# Patient Record
Sex: FEMALE | Race: WHITE | NOT HISPANIC OR LATINO | Employment: UNEMPLOYED | URBAN - METROPOLITAN AREA
[De-identification: names, ages, dates, MRNs, and addresses within clinical notes are randomized per-mention and may not be internally consistent; named-entity substitution may affect disease eponyms.]

---

## 2022-06-09 ENCOUNTER — HOSPITAL ENCOUNTER (INPATIENT)
Facility: HOSPITAL | Age: 37
LOS: 2 days | Discharge: HOME | End: 2022-06-11
Attending: SPECIALIST | Admitting: SPECIALIST
Payer: MEDICAID

## 2022-06-09 DIAGNOSIS — K85.90 ACUTE PANCREATITIS, UNSPECIFIED COMPLICATION STATUS, UNSPECIFIED PANCREATITIS TYPE: Primary | ICD-10-CM

## 2022-06-09 DIAGNOSIS — R10.9 ABDOMINAL PAIN: ICD-10-CM

## 2022-06-09 LAB
ABO + RH BLD: NORMAL
ALBUMIN SERPL BCP-MCNC: 2.8 G/DL (ref 3.5–5.2)
ALP SERPL-CCNC: 86 U/L (ref 55–135)
ALT SERPL W/O P-5'-P-CCNC: 9 U/L (ref 10–44)
ANION GAP SERPL CALC-SCNC: 9 MMOL/L (ref 8–16)
AST SERPL-CCNC: 15 U/L (ref 10–40)
BACTERIA #/AREA URNS HPF: ABNORMAL /HPF
BASOPHILS # BLD AUTO: 0.02 K/UL (ref 0–0.2)
BASOPHILS NFR BLD: 0.1 % (ref 0–1.9)
BILIRUB SERPL-MCNC: 1.3 MG/DL (ref 0.1–1)
BILIRUB UR QL STRIP: ABNORMAL
BLD GP AB SCN CELLS X3 SERPL QL: NORMAL
BUN SERPL-MCNC: 12 MG/DL (ref 6–20)
CALCIUM SERPL-MCNC: 8.4 MG/DL (ref 8.7–10.5)
CHLORIDE SERPL-SCNC: 102 MMOL/L (ref 95–110)
CLARITY UR: CLEAR
CO2 SERPL-SCNC: 20 MMOL/L (ref 23–29)
COLOR UR: YELLOW
CREAT SERPL-MCNC: 0.7 MG/DL (ref 0.5–1.4)
DIFFERENTIAL METHOD: ABNORMAL
EOSINOPHIL # BLD AUTO: 0.1 K/UL (ref 0–0.5)
EOSINOPHIL NFR BLD: 0.6 % (ref 0–8)
ERYTHROCYTE [DISTWIDTH] IN BLOOD BY AUTOMATED COUNT: 17.4 % (ref 11.5–14.5)
EST. GFR  (AFRICAN AMERICAN): >60 ML/MIN/1.73 M^2
EST. GFR  (NON AFRICAN AMERICAN): >60 ML/MIN/1.73 M^2
GLUCOSE SERPL-MCNC: 106 MG/DL (ref 70–110)
GLUCOSE UR QL STRIP: NEGATIVE
HCT VFR BLD AUTO: 39 % (ref 37–48.5)
HGB BLD-MCNC: 12.5 G/DL (ref 12–16)
HGB UR QL STRIP: NEGATIVE
HYALINE CASTS #/AREA URNS LPF: 40 /LPF
IMM GRANULOCYTES # BLD AUTO: 0.07 K/UL (ref 0–0.04)
IMM GRANULOCYTES NFR BLD AUTO: 0.5 % (ref 0–0.5)
KETONES UR QL STRIP: 100
LACTATE SERPL-SCNC: 1.5 MMOL/L (ref 0.5–1.9)
LEUKOCYTE ESTERASE UR QL STRIP: NEGATIVE
LIPASE SERPL-CCNC: 551 U/L (ref 4–60)
LYMPHOCYTES # BLD AUTO: 1.3 K/UL (ref 1–4.8)
LYMPHOCYTES NFR BLD: 8.6 % (ref 18–48)
MCH RBC QN AUTO: 26.4 PG (ref 27–31)
MCHC RBC AUTO-ENTMCNC: 32.1 G/DL (ref 32–36)
MCV RBC AUTO: 82 FL (ref 82–98)
MICROSCOPIC COMMENT: ABNORMAL
MONOCYTES # BLD AUTO: 0.8 K/UL (ref 0.3–1)
MONOCYTES NFR BLD: 5.2 % (ref 4–15)
NEUTROPHILS # BLD AUTO: 13.2 K/UL (ref 1.8–7.7)
NEUTROPHILS NFR BLD: 85 % (ref 38–73)
NITRITE UR QL STRIP: NEGATIVE
NRBC BLD-RTO: 0 /100 WBC
PH UR STRIP: 6 [PH] (ref 5–8)
PLATELET # BLD AUTO: 221 K/UL (ref 150–450)
PMV BLD AUTO: 11.1 FL (ref 9.2–12.9)
POTASSIUM SERPL-SCNC: 4.4 MMOL/L (ref 3.5–5.1)
PROT SERPL-MCNC: 6.4 G/DL (ref 6–8.4)
PROT UR QL STRIP: ABNORMAL
RBC # BLD AUTO: 4.74 M/UL (ref 4–5.4)
RBC #/AREA URNS HPF: 3 /HPF (ref 0–4)
SODIUM SERPL-SCNC: 131 MMOL/L (ref 136–145)
SP GR UR STRIP: >1.03 (ref 1–1.03)
SQUAMOUS #/AREA URNS HPF: 10 /HPF
URN SPEC COLLECT METH UR: ABNORMAL
UROBILINOGEN UR STRIP-ACNC: NEGATIVE EU/DL
WBC # BLD AUTO: 15.49 K/UL (ref 3.9–12.7)
WBC #/AREA URNS HPF: 9 /HPF (ref 0–5)

## 2022-06-09 PROCEDURE — 86850 RBC ANTIBODY SCREEN: CPT | Performed by: SPECIALIST

## 2022-06-09 PROCEDURE — 93010 ELECTROCARDIOGRAM REPORT: CPT | Mod: ,,, | Performed by: INTERNAL MEDICINE

## 2022-06-09 PROCEDURE — 85025 COMPLETE CBC W/AUTO DIFF WBC: CPT | Performed by: NURSE PRACTITIONER

## 2022-06-09 PROCEDURE — 87040 BLOOD CULTURE FOR BACTERIA: CPT | Mod: 59 | Performed by: PHYSICIAN ASSISTANT

## 2022-06-09 PROCEDURE — 63600175 PHARM REV CODE 636 W HCPCS: Performed by: SPECIALIST

## 2022-06-09 PROCEDURE — 25000003 PHARM REV CODE 250: Performed by: SPECIALIST

## 2022-06-09 PROCEDURE — 83605 ASSAY OF LACTIC ACID: CPT | Performed by: PHYSICIAN ASSISTANT

## 2022-06-09 PROCEDURE — S5010 5% DEXTROSE AND 0.45% SALINE: HCPCS | Performed by: SPECIALIST

## 2022-06-09 PROCEDURE — 36415 COLL VENOUS BLD VENIPUNCTURE: CPT | Performed by: PHYSICIAN ASSISTANT

## 2022-06-09 PROCEDURE — 93005 ELECTROCARDIOGRAM TRACING: CPT | Performed by: INTERNAL MEDICINE

## 2022-06-09 PROCEDURE — 83690 ASSAY OF LIPASE: CPT | Performed by: NURSE PRACTITIONER

## 2022-06-09 PROCEDURE — 12000002 HC ACUTE/MED SURGE SEMI-PRIVATE ROOM

## 2022-06-09 PROCEDURE — 93010 EKG 12-LEAD: ICD-10-PCS | Mod: ,,, | Performed by: INTERNAL MEDICINE

## 2022-06-09 PROCEDURE — 80053 COMPREHEN METABOLIC PANEL: CPT | Performed by: NURSE PRACTITIONER

## 2022-06-09 PROCEDURE — 81001 URINALYSIS AUTO W/SCOPE: CPT | Performed by: NURSE PRACTITIONER

## 2022-06-09 PROCEDURE — 99999 HC NO LEVEL OF SERVICE - ED ONLY: CPT

## 2022-06-09 RX ORDER — BETAMETHASONE SODIUM PHOSPHATE AND BETAMETHASONE ACETATE 3; 3 MG/ML; MG/ML
12 INJECTION, SUSPENSION INTRA-ARTICULAR; INTRALESIONAL; INTRAMUSCULAR; SOFT TISSUE
Status: COMPLETED | OUTPATIENT
Start: 2022-06-09 | End: 2022-06-10

## 2022-06-09 RX ORDER — BETAMETHASONE SODIUM PHOSPHATE AND BETAMETHASONE ACETATE 3; 3 MG/ML; MG/ML
6 INJECTION, SUSPENSION INTRA-ARTICULAR; INTRALESIONAL; INTRAMUSCULAR; SOFT TISSUE
Status: DISCONTINUED | OUTPATIENT
Start: 2022-06-09 | End: 2022-06-09

## 2022-06-09 RX ORDER — ZOLPIDEM TARTRATE 5 MG/1
5 TABLET ORAL NIGHTLY PRN
Status: DISCONTINUED | OUTPATIENT
Start: 2022-06-09 | End: 2022-06-11 | Stop reason: HOSPADM

## 2022-06-09 RX ORDER — ONDANSETRON 4 MG/1
TABLET, FILM COATED ORAL 2 TIMES DAILY
Status: ON HOLD | COMMUNITY
End: 2022-06-11

## 2022-06-09 RX ORDER — MORPHINE SULFATE 4 MG/ML
2 INJECTION, SOLUTION INTRAMUSCULAR; INTRAVENOUS EVERY 4 HOURS PRN
Status: DISCONTINUED | OUTPATIENT
Start: 2022-06-09 | End: 2022-06-11 | Stop reason: HOSPADM

## 2022-06-09 RX ORDER — DEXTROSE MONOHYDRATE AND SODIUM CHLORIDE 5; .45 G/100ML; G/100ML
INJECTION, SOLUTION INTRAVENOUS CONTINUOUS
Status: DISCONTINUED | OUTPATIENT
Start: 2022-06-09 | End: 2022-06-11 | Stop reason: HOSPADM

## 2022-06-09 RX ORDER — ONDANSETRON 2 MG/ML
8 INJECTION INTRAMUSCULAR; INTRAVENOUS EVERY 12 HOURS PRN
Status: DISCONTINUED | OUTPATIENT
Start: 2022-06-09 | End: 2022-06-10

## 2022-06-09 RX ORDER — SODIUM CHLORIDE, SODIUM LACTATE, POTASSIUM CHLORIDE, CALCIUM CHLORIDE 600; 310; 30; 20 MG/100ML; MG/100ML; MG/100ML; MG/100ML
INJECTION, SOLUTION INTRAVENOUS CONTINUOUS
Status: DISCONTINUED | OUTPATIENT
Start: 2022-06-09 | End: 2022-06-11 | Stop reason: HOSPADM

## 2022-06-09 RX ADMIN — DEXTROSE AND SODIUM CHLORIDE: 5; .45 INJECTION, SOLUTION INTRAVENOUS at 07:06

## 2022-06-09 RX ADMIN — BETAMETHASONE ACETATE AND BETAMETHASONE SODIUM PHOSPHATE 12 MG: 3; 3 INJECTION, SUSPENSION INTRA-ARTICULAR; INTRALESIONAL; INTRAMUSCULAR; SOFT TISSUE at 08:06

## 2022-06-09 RX ADMIN — SODIUM CHLORIDE, SODIUM LACTATE, POTASSIUM CHLORIDE, AND CALCIUM CHLORIDE 1000 ML: .6; .31; .03; .02 INJECTION, SOLUTION INTRAVENOUS at 08:06

## 2022-06-09 RX ADMIN — SODIUM CHLORIDE, SODIUM LACTATE, POTASSIUM CHLORIDE, AND CALCIUM CHLORIDE: .6; .31; .03; .02 INJECTION, SOLUTION INTRAVENOUS at 09:06

## 2022-06-09 RX ADMIN — MORPHINE SULFATE 2 MG: 4 INJECTION, SOLUTION INTRAMUSCULAR; INTRAVENOUS at 08:06

## 2022-06-09 NOTE — FIRST PROVIDER EVALUATION
Emergency Department TeleTriage Encounter Note      CHIEF COMPLAINT    Chief Complaint   Patient presents with    Abdominal Pain     36 WEEKS, SEEN AND OBSERVED AT [facility] X 1 DAY SENT HERE FOR EVAL OF PANCREATITIS       VITAL SIGNS   Initial Vitals [06/09/22 1608]   BP Pulse Resp Temp SpO2   (!) 140/115 (!) 136 (!) 22 98.2 °F (36.8 °C) 98 %      MAP       --            ALLERGIES    Review of patient's allergies indicates:  No Known Allergies    PROVIDER TRIAGE NOTE  Patient is a 36-year-old female who is currently 36 weeks pregnant.  She was admitted at [facility] for acute pancreatitis but her OB is going out of town and recommended she come to a higher level of care facility.  She was receiving IV fluids, nausea medicine and pain medicine as needed.    MRI abdomen 6/9 at [place]: Findings compatible with acute interstitial edematous pancreatitis.     Imaging from prior facility reviewed.  Will initiate additional lab work.  Patient is currently tachycardic.      ORDERS  Labs Reviewed   CBC W/ AUTO DIFFERENTIAL   COMPREHENSIVE METABOLIC PANEL   URINALYSIS, REFLEX TO URINE CULTURE   LIPASE   POCT URINE PREGNANCY       ED Orders (720h ago, onward)    Start Ordered     Status Ordering Provider    06/09/22 2027 06/09/22 1627  Lactic acid, plasma #2  In 4 hours         Ordered NEGROTTO SANTOSH SOSA    06/09/22 1627 06/09/22 1627  ED Preference List Used to Initiate Sepsis Orders  Until discontinued         Ordered NEGROTTO SANTOSH SOSA    06/09/22 1627 06/09/22 1627  Blood culture x two cultures. Draw prior to antibiotics.  Every 15 min      Comments: Aerobic and anaerobic     Start Status Ordering Provider   06/09/22 1627 Scheduled NEGROTTO SANTOSH SOSA   06/09/22 1642 Scheduled NEGROTTO SHEILA SANTOSH       Ordered NEGROTTO SHEILASANTOSH    06/09/22 1627 06/09/22 1627  Lactic acid, plasma #1  STAT         Ordered NEGROTTO SHEILASANTOSH LANE    06/09/22 1627 06/09/22 1627   Vital signs  Every 15 min        Comments: Every 15 minutes until SBP greater than 90 or MAP greater than 65, then every 30 minutes times one hour, then every one hour.    Ordered NEGROTTO SHEILA SANTOSH    06/09/22 1627 06/09/22 1627  Bed rest  Until discontinued         Ordered NEGROTTO SHEILA, SANTOSH    06/09/22 1627 06/09/22 1627  Cardiac Monitoring - Adult  Continuous        Comments: Notify Physician If:    Ordered NEGROTTO SHEILASANTOSH LANE    06/09/22 1627 06/09/22 1627  Pulse Oximetry Continuous  Continuous         Ordered NEGROTTO SHEILASANTOSH LANE    06/09/22 1627 06/09/22 1627  Strict intake and output  Until discontinued         Ordered NEGROTTO SHEILASANTOSH LANE    06/09/22 1611 06/09/22 1611  CBC auto differential  STAT         In process LORENA DIAZ    06/09/22 1611 06/09/22 1611  Comprehensive metabolic panel  STAT         In process LORENA DIAZ    06/09/22 1611 06/09/22 1611  Urinalysis, Reflex to Urine Culture Urine, Clean Catch  STAT        Comments: In and Out Cath as needed it patient unable to void      Acknowledged LORENA DIAZ    06/09/22 1611 06/09/22 1611  IV Saline Lock  Once         Acknowledged LORENA DIAZ    06/09/22 1611 06/09/22 1611  Lipase  STAT        Comments: For upper or mid abdominal pain.      In process LORENA DIAZ    06/09/22 1611 06/09/22 1611  POCT urine pregnancy  Once        Comments: For women of childbearing age w/o hysterectomy.    Acknowledged LORENA DIAZ    06/09/22 1611 06/09/22 1611  EKG 12-lead  Once        Comments: If patient >45 yrs.    Acknowledged LORENA DIAZ    06/09/22 1611 06/09/22 1611  Assess fetal heart tones  Until discontinued         Acknowledged LORENA DIAZ            Virtual Visit Note: The provider triage portion of this emergency department evaluation and documentation was performed via [employer], a HIPAA-compliant telemedicine application, in concert with a tele-presenter in  the room. A face to face patient evaluation with one of my colleagues will occur once the patient is placed in an emergency department room.      DISCLAIMER: This note was prepared with [employer] voice recognition transcription software. Garbled syntax, mangled pronouns, and other bizarre constructions may be attributed to that software system.

## 2022-06-10 LAB
ALBUMIN SERPL BCP-MCNC: 2.3 G/DL (ref 3.5–5.2)
ALP SERPL-CCNC: 75 U/L (ref 55–135)
ALT SERPL W/O P-5'-P-CCNC: 9 U/L (ref 10–44)
AMYLASE SERPL-CCNC: 269 U/L (ref 20–110)
ANION GAP SERPL CALC-SCNC: 10 MMOL/L (ref 8–16)
AST SERPL-CCNC: 13 U/L (ref 10–40)
BASOPHILS # BLD AUTO: 0.02 K/UL (ref 0–0.2)
BASOPHILS NFR BLD: 0.1 % (ref 0–1.9)
BILIRUB SERPL-MCNC: 1 MG/DL (ref 0.1–1)
BUN SERPL-MCNC: 10 MG/DL (ref 6–20)
CALCIUM SERPL-MCNC: 8.6 MG/DL (ref 8.7–10.5)
CHLORIDE SERPL-SCNC: 102 MMOL/L (ref 95–110)
CO2 SERPL-SCNC: 20 MMOL/L (ref 23–29)
CREAT SERPL-MCNC: 0.6 MG/DL (ref 0.5–1.4)
DIFFERENTIAL METHOD: ABNORMAL
EOSINOPHIL # BLD AUTO: 0 K/UL (ref 0–0.5)
EOSINOPHIL NFR BLD: 0 % (ref 0–8)
ERYTHROCYTE [DISTWIDTH] IN BLOOD BY AUTOMATED COUNT: 17.3 % (ref 11.5–14.5)
EST. GFR  (AFRICAN AMERICAN): >60 ML/MIN/1.73 M^2
EST. GFR  (NON AFRICAN AMERICAN): >60 ML/MIN/1.73 M^2
GLUCOSE SERPL-MCNC: 115 MG/DL (ref 70–110)
HCT VFR BLD AUTO: 31.9 % (ref 37–48.5)
HGB BLD-MCNC: 10.1 G/DL (ref 12–16)
IMM GRANULOCYTES # BLD AUTO: 0.12 K/UL (ref 0–0.04)
IMM GRANULOCYTES NFR BLD AUTO: 0.8 % (ref 0–0.5)
LIPASE SERPL-CCNC: 163 U/L (ref 4–60)
LYMPHOCYTES # BLD AUTO: 0.9 K/UL (ref 1–4.8)
LYMPHOCYTES NFR BLD: 6.2 % (ref 18–48)
MCH RBC QN AUTO: 26.6 PG (ref 27–31)
MCHC RBC AUTO-ENTMCNC: 31.7 G/DL (ref 32–36)
MCV RBC AUTO: 84 FL (ref 82–98)
MONOCYTES # BLD AUTO: 0.5 K/UL (ref 0.3–1)
MONOCYTES NFR BLD: 3.7 % (ref 4–15)
NEUTROPHILS # BLD AUTO: 13.1 K/UL (ref 1.8–7.7)
NEUTROPHILS NFR BLD: 89.2 % (ref 38–73)
NRBC BLD-RTO: 0 /100 WBC
PLATELET # BLD AUTO: 194 K/UL (ref 150–450)
PMV BLD AUTO: 11.3 FL (ref 9.2–12.9)
POTASSIUM SERPL-SCNC: 4.5 MMOL/L (ref 3.5–5.1)
PROT SERPL-MCNC: 5.5 G/DL (ref 6–8.4)
RBC # BLD AUTO: 3.8 M/UL (ref 4–5.4)
SODIUM SERPL-SCNC: 132 MMOL/L (ref 136–145)
WBC # BLD AUTO: 14.65 K/UL (ref 3.9–12.7)

## 2022-06-10 PROCEDURE — 36415 COLL VENOUS BLD VENIPUNCTURE: CPT | Performed by: SPECIALIST

## 2022-06-10 PROCEDURE — 12000002 HC ACUTE/MED SURGE SEMI-PRIVATE ROOM

## 2022-06-10 PROCEDURE — 25000003 PHARM REV CODE 250: Performed by: SPECIALIST

## 2022-06-10 PROCEDURE — 82150 ASSAY OF AMYLASE: CPT | Performed by: SPECIALIST

## 2022-06-10 PROCEDURE — 63600175 PHARM REV CODE 636 W HCPCS: Performed by: SPECIALIST

## 2022-06-10 PROCEDURE — 85025 COMPLETE CBC W/AUTO DIFF WBC: CPT | Performed by: SPECIALIST

## 2022-06-10 PROCEDURE — 83690 ASSAY OF LIPASE: CPT | Performed by: SPECIALIST

## 2022-06-10 PROCEDURE — 80053 COMPREHEN METABOLIC PANEL: CPT | Performed by: SPECIALIST

## 2022-06-10 RX ORDER — FAMOTIDINE 10 MG/ML
20 INJECTION INTRAVENOUS 2 TIMES DAILY
Status: DISCONTINUED | OUTPATIENT
Start: 2022-06-10 | End: 2022-06-11 | Stop reason: HOSPADM

## 2022-06-10 RX ORDER — ONDANSETRON 2 MG/ML
4 INJECTION INTRAMUSCULAR; INTRAVENOUS EVERY 4 HOURS PRN
Status: DISCONTINUED | OUTPATIENT
Start: 2022-06-10 | End: 2022-06-11 | Stop reason: HOSPADM

## 2022-06-10 RX ORDER — ACETAMINOPHEN 500 MG
1000 TABLET ORAL ONCE
Status: COMPLETED | OUTPATIENT
Start: 2022-06-10 | End: 2022-06-10

## 2022-06-10 RX ADMIN — BETAMETHASONE ACETATE AND BETAMETHASONE SODIUM PHOSPHATE 12 MG: 3; 3 INJECTION, SUSPENSION INTRA-ARTICULAR; INTRALESIONAL; INTRAMUSCULAR; SOFT TISSUE at 09:06

## 2022-06-10 RX ADMIN — ACETAMINOPHEN 1000 MG: 500 TABLET ORAL at 12:06

## 2022-06-10 RX ADMIN — MORPHINE SULFATE 2 MG: 4 INJECTION, SOLUTION INTRAMUSCULAR; INTRAVENOUS at 06:06

## 2022-06-10 RX ADMIN — ONDANSETRON 4 MG: 2 INJECTION INTRAMUSCULAR; INTRAVENOUS at 03:06

## 2022-06-10 RX ADMIN — SODIUM CHLORIDE, SODIUM LACTATE, POTASSIUM CHLORIDE, AND CALCIUM CHLORIDE: .6; .31; .03; .02 INJECTION, SOLUTION INTRAVENOUS at 04:06

## 2022-06-10 RX ADMIN — ONDANSETRON 8 MG: 2 INJECTION INTRAMUSCULAR; INTRAVENOUS at 06:06

## 2022-06-10 RX ADMIN — MORPHINE SULFATE 2 MG: 4 INJECTION, SOLUTION INTRAMUSCULAR; INTRAVENOUS at 07:06

## 2022-06-10 RX ADMIN — FAMOTIDINE 20 MG: 10 INJECTION INTRAVENOUS at 07:06

## 2022-06-10 RX ADMIN — FAMOTIDINE 20 MG: 10 INJECTION INTRAVENOUS at 09:06

## 2022-06-10 RX ADMIN — PROMETHAZINE HYDROCHLORIDE 12.5 MG: 25 INJECTION INTRAMUSCULAR; INTRAVENOUS at 07:06

## 2022-06-10 NOTE — CONSULTS
GASTROENTEROLOGY INPATIENT CONSULT NOTE  Patient Name: Cely Dove  Patient MRN: [id number]  Patient : 1985    Admit Date: 2022  Service date: 22    Reason for Consult:  Abdominal pain    PCP: Robina Bar MD    Chief Complaint   Patient presents with    Abdominal Pain     36 WEEKS, SEEN AND OBSERVED AT [facility] X 1 DAY SENT HERE FOR EVAL OF PANCREATITIS       HPI: Patient is a 36 y.o. female 35 weeks pregnant admitted to [facility] on Wednesday with acute severe persistent epigastric stabbing pain new onset.  Underwent ultrasound lipase an MRI testing showing elevated lipase and findings of pancreatitis without choledocholithiasis.  Patient received IV hydration was subsequently discharged due to lack of high-risk OB coverage over the weekend.  Patient presented in our ER with ongoing moderate epigastric discomfort and was subsequently admitted.  Labs today represent hemodilution after receiving an additional bolus of fluid and fluid overnight.  She reports that her pain is improving currently rated 5/10 from 10/10 on Wednesday.  Denies nausea vomiting.  Tolerating liquid diet    Past Medical History:  Past Medical History:   Diagnosis Date    Chronic neck pain     Headache, chronic migraine without aura     Nicotine dependence with current use     Pancreatitis     Simple obesity         Past Surgical History:  Past Surgical History:   Procedure Laterality Date    APPENDECTOMY       SECTION      TUBAL LIGATION      tubal ligation reversal Bilateral         Home Medications:  Medications Prior to Admission   Medication Sig Dispense Refill Last Dose    ondansetron (ZOFRAN) 4 MG tablet Take by mouth 2 (two) times daily.   2022 at Unknown time    norethindrone-e.estradiol-iron (LO LOESTRIN FE) 1 mg-10 mcg(24) /10 mcg (2) Tab Take 1 tablet by mouth once daily. 28 tablet 12     prenatal vit/iron fum/folic ac (PRENATAL 1+1 ORAL) Take by mouth.     "      Inpatient Medications:   betamethasone acetate-betamethasone sodium phosphate  12 mg Intramuscular Q24H    famotidine (PF)  20 mg Intravenous BID     morphine, ondansetron, promethazine (PHENERGAN) IVPB, zolpidem    Review of patient's allergies indicates:  No Known Allergies    Social History:   Social History     Occupational History    Not on file   Tobacco Use    Smoking status: Never Smoker    Smokeless tobacco: Not on file   Substance and Sexual Activity    Alcohol use: No    Drug use: No    Sexual activity: Yes       Family History:   Family History   Problem Relation Age of Onset    Breast cancer Neg Hx     Ovarian cancer Neg Hx        Review of Systems:  A 10 point review of systems was performed and was normal, except as mentioned in the HPI, including constitutional, HEENT, heme, lymph, cardiovascular, respiratory, gastrointestinal, genitourinary, neurologic, endocrine, psychiatric and musculoskeletal.      OBJECTIVE:    Physical Exam:  24 Hour Vital Sign Ranges: Temp:  [98.2 °F (36.8 °C)-99.1 °F (37.3 °C)] 98.7 °F (37.1 °C)  Pulse:  [] 106  Resp:  [16-22] 16  SpO2:  [98 %-100 %] 100 %  BP: (103-140)/() 107/64  Most recent vitals: /64   Pulse 106   Temp 98.7 °F (37.1 °C) (Oral)   Resp 16   Ht 5' 6" (1.676 m)   Wt 96.6 kg (213 lb)   SpO2 100%   Breastfeeding No   BMI 34.38 kg/m²    GEN: well-developed, well-nourished, awake and alert, non-toxic appearing adult  HEENT: PERRL, sclera anicteric, oral mucosa pink and moist without lesion  NECK: trachea midline; Good ROM  CV: regular rate and rhythm, no murmurs or gallops  RESP: clear to auscultation bilaterally, no wheezes, rhonci or rales  ABD: soft, non-tender, non-distended, normal bowel sounds  EXT: no swelling or edema, 2+ pulses distally  SKIN: no rashes or jaundice  PSYCH: normal affect    Labs:   Recent Labs     06/09/22  1622 06/10/22  0559   WBC 15.49* 14.65*   MCV 82 84    194     Recent Labs     " 06/09/22  1622 06/10/22  0559   * 132*   K 4.4 4.5    102   CO2 20* 20*   BUN 12 10    115*     No results for input(s): ALB in the last 72 hours.    Invalid input(s): ALKP, SGOT, SGPT, TBIL, DBIL, TPRO  No results for input(s): PT, INR, PTT in the last 72 hours.      Radiology Review:  US OB 14+ Weeks TransAbd, w/Biophysical Profile, w/o NST, Single Gestation (xpd)    (Results Pending)         IMPRESSION / RECOMMENDATIONS:  Biliary pancreatitis  -continue with IV hydration, ambulation, SCDs or DVT prophylaxis if patient lying in bed.  Recommend cholecystectomy in near future defer to gyn and surgery.  No evidence of choledocholithiasis and LFTs normal  -will f/u with patient tomorrow and likely d/c if continues to improve    Thank you for this consult.    Adriano Tavarez  6/10/2022  2:41 PM

## 2022-06-10 NOTE — NURSING
Updated Dr. Cabrera on patients status. Dr. Tavarez has not been to unit to assess patient. Orders to contact Dr. Tavarez to get an approximate time of arrival in order to make plan of care. Also orders to discontinue EFM due to reactive fetal heart tones. Will update when speak with Dr. Tavarez.    1135 Patient sleeping in bed. NAD.    12:03 PM Patient stated she felt like her contractions were coming more frequently. TOCO placed.    1:01 PM Dr. Cabrera at bedside for assessment. Told patient Dr. Tavarez should be on unit shortly for evaluation and they will decide plan of care from there. Patient and family agreed.    2:33 PM Dr. Tavarez at bedside for GI consultation. Orders for patient to ambulate around unit, clear liquids, continue IV fluids. May discharge tomorrow.    3:38 PM Pt ambulating hallways with family.    5:05 PM Pt back from walk. Resting quietly in bed.

## 2022-06-10 NOTE — H&P
[facility]  Obstetrics  History & Physical    Patient Name: Cely Dove  MRN: [id number]  Admission Date: 2022  Primary Care Provider: Robina Bar MD    Subjective:     Principal Problem:Pancreatitis    History of Present Illness:  36-year-old white female  4 para 2 at 35 weeks and 1 day presents to [facility] Emergency Room after having left [facility] against medical advice for a diagnosis of acute pancreatitis.  Patient was admitted yesterday .  Patient apparently was encouraged to leave to go to a higher level of care as there was no NICU at [place].  Patient briefly seen in the emergency room and sent to Labor and delivery for further evaluation.  At [place], patient was seen by her OB and by the general surgeon on-call and was treated with IV hydration and IV pain medication with the patient stating she was getting morphine every 2 hours for pain control.  She does admit to last nausea vomiting and improved pain than on admission.  Ultrasound of the baby is approximately 2700 g with the KISHORE of 6.8.  Fetal heart rate monitoring according to the notes shows accelerations.  Are fetal heart rate pattern initially showed decreased variability however this may be consistent with amount of morphine that the patient has received.  GI consult was initiated and will continue with fluid management and NPO for now and he will see in the morning.  Of note her initial lipase was 2500+ and is down to 591 as of the last lab earlier today.    Obstetric HPI:  Patient reports None contractions, active fetal movement, No vaginal bleeding , No loss of fluid     This pregnancy has been complicated by this episode of acute pancreatitis diagnosed by MRI at [facility]    OB History    Para Term  AB Living   4 2 0 0 1 2   SAB IAB Ectopic Multiple Live Births   1 0 0 0 0      # Outcome Date GA Lbr Rakan/2nd Weight Sex Delivery Anes PTL Lv   4 Current             3 SAB            2 Para            1 Para      Vag-Spont        Past Medical History:   Diagnosis Date    Chronic neck pain     Headache, chronic migraine without aura     Nicotine dependence with current use     Pancreatitis     Simple obesity      Past Surgical History:   Procedure Laterality Date    APPENDECTOMY       SECTION      TUBAL LIGATION      tubal ligation reversal Bilateral        PTA Medications   Medication Sig    ondansetron (ZOFRAN) 4 MG tablet Take by mouth 2 (two) times daily.    norethindrone-e.estradiol-iron (LO LOESTRIN FE) 1 mg-10 mcg(24) /10 mcg (2) Tab Take 1 tablet by mouth once daily.    prenatal vit/iron fum/folic ac (PRENATAL 1+1 ORAL) Take by mouth.       Review of patient's allergies indicates:  No Known Allergies     Family History    None       Tobacco Use    Smoking status: Never Smoker    Smokeless tobacco: Not on file   Substance and Sexual Activity    Alcohol use: No    Drug use: No    Sexual activity: Yes     Review of Systems   Objective:     Vital Signs (Most Recent):  Temp: 99.1 °F (37.3 °C) (22)  Pulse: 109 (22)  Resp: 18 (22)  BP: 120/75 (22)  SpO2: 100 % (22) Vital Signs (24h Range):  Temp:  [98.2 °F (36.8 °C)-99.1 °F (37.3 °C)] 99.1 °F (37.3 °C)  Pulse:  [109-136] 109  Resp:  [16-22] 18  SpO2:  [98 %-100 %] 100 %  BP: (120-140)/() 120/75     Weight: 96.6 kg (213 lb)  Body mass index is 34.38 kg/m².    Physical    Generally alert and oriented no acute distress but appears uncomfortable  Abdomen soft tender to upper quadrants  Extremities negative  Cervix closed per report from [place]         Significant Labs:  Lab Results   Component Value Date    GROUPTRH O POS 2005    HEPBSAG Negative 2005       I have personallly reviewed all pertinent lab results from the last 24 hours.  Recent Lab Results       22  1622        Albumin 2.8       Alkaline Phosphatase 86        ALT 9       Anion Gap 9       AST 15       Baso # 0.02       Basophil % 0.1       BILIRUBIN TOTAL 1.3  Comment: For infants and newborns, interpretation of results should be based  on gestational age, weight and in agreement with clinical  observations.    Premature Infant recommended reference ranges:  Up to 24 hours.............<8.0 mg/dL  Up to 48 hours............<12.0 mg/dL  3-5 days..................<15.0 mg/dL  6-29 days.................<15.0 mg/dL         BUN 12       Calcium 8.4       Chloride 102       CO2 20       Creatinine 0.7       Differential Method Automated       eGFR if  >60.0       eGFR if non  >60.0  Comment: Calculation used to obtain the estimated glomerular filtration  rate (eGFR) is the CKD-EPI equation.          Eos # 0.1       Eosinophil % 0.6       Glucose 106       Gran # (ANC) 13.2       Gran % 85.0       Hematocrit 39.0       Hemoglobin 12.5       Immature Grans (Abs) 0.07  Comment: Mild elevation in immature granulocytes is non specific and   can be seen in a variety of conditions including stress response,   acute inflammation, trauma and pregnancy. Correlation with other   laboratory and clinical findings is essential.         Immature Granulocytes 0.5       Lipase 551       Lymph # 1.3       Lymph % 8.6       MCH 26.4       MCHC 32.1       MCV 82       Mono # 0.8       Mono % 5.2       MPV 11.1       nRBC 0       Platelets 221       Potassium 4.4       PROTEIN TOTAL 6.4       RBC 4.74       RDW 17.4       Sodium 131       WBC 15.49             Assessment/Plan:     36 y.o. female  at 35w1d for:    Active Diagnoses:    Diagnosis Date Noted POA    PRINCIPAL PROBLEM:  Pancreatitis [K85.90] 2022 Yes      Problems Resolved During this Admission:       IUP at 35 weeks and 1 day history of previous   Acute pancreatitis felt secondary to sludge  GI consult pending  Continue NPO with IV hydration and pain medicine as needed  Repeat  labs in the morning    Darnell Cabrera MD  Obstetrics  [facility]

## 2022-06-11 VITALS
SYSTOLIC BLOOD PRESSURE: 106 MMHG | HEIGHT: 66 IN | RESPIRATION RATE: 17 BRPM | OXYGEN SATURATION: 99 % | DIASTOLIC BLOOD PRESSURE: 59 MMHG | WEIGHT: 213 LBS | TEMPERATURE: 98 F | HEART RATE: 91 BPM | BODY MASS INDEX: 34.23 KG/M2

## 2022-06-11 PROCEDURE — 25000003 PHARM REV CODE 250: Performed by: SPECIALIST

## 2022-06-11 RX ORDER — MEPERIDINE HYDROCHLORIDE 25 MG/ML
25 INJECTION INTRAMUSCULAR; INTRAVENOUS; SUBCUTANEOUS EVERY 4 HOURS PRN
Status: DISCONTINUED | OUTPATIENT
Start: 2022-06-11 | End: 2022-06-11 | Stop reason: HOSPADM

## 2022-06-11 RX ORDER — ACETAMINOPHEN 500 MG
1000 TABLET ORAL EVERY 8 HOURS PRN
Status: DISCONTINUED | OUTPATIENT
Start: 2022-06-11 | End: 2022-06-11 | Stop reason: HOSPADM

## 2022-06-11 RX ADMIN — ACETAMINOPHEN 1000 MG: 500 TABLET ORAL at 01:06

## 2022-06-11 RX ADMIN — FAMOTIDINE 20 MG: 10 INJECTION INTRAVENOUS at 08:06

## 2022-06-11 NOTE — SUBJECTIVE & OBJECTIVE
Obstetric HPI:  Hospital day 3. IUP at 35 weeks 3 days gestational age with pancreatitis    Patient states feeling much better today, tolerating clears.  No obstetric complaints, states active fetal movement no contractions or vaginal bleeding      Objective:     Vital Signs (Most Recent):  Temp: 97.7 °F (36.5 °C) (06/11/22 0844)  Pulse: 91 (06/11/22 0850)  Resp: 17 (06/11/22 0844)  BP: (!) 106/59 (06/11/22 0844)  SpO2: 99 % (06/11/22 0850)   Vital Signs (24h Range):  Temp:  [97.7 °F (36.5 °C)-98.7 °F (37.1 °C)] 97.7 °F (36.5 °C)  Pulse:  [] 91  Resp:  [16-18] 17  SpO2:  [99 %] 99 %  BP: (106-119)/(59-73) 106/59     Weight: 96.6 kg (213 lb)  Body mass index is 34.38 kg/m².    FHT:  Baseline 130s and reactive   TOCO:  No contraction pattern      Intake/Output Summary (Last 24 hours) at 6/11/2022 1057  Last data filed at 6/11/2022 0900  Gross per 24 hour   Intake 1890 ml   Output 3350 ml   Net -1460 ml       Cervix-deferred     Significant Labs:  Recent Lab Results       None            Physical Exam   General-no apparent distress, sitting up in chair resting comfortably  Abdomen-soft minimal to no tenderness to upper abdomen  Uterus-gravid and nontender  Extremities-no calf tenderness

## 2022-06-11 NOTE — PROGRESS NOTES
"Patient Name: Cely Dove  Patient MRN: [id number]  Patient : 1985    Admit Date: 2022  Service date: 2022    Reason for Consult: acute biliary pancreatitis    PCP: Robina Bar MD    S: Patient reports feeling significant improvement in abdominal pain. She has tolerated clear liquids without n/v & is awaiting discharge home.    Inpatient Medications:   famotidine (PF)  20 mg Intravenous BID     acetaminophen, meperidine, morphine, ondansetron, promethazine (PHENERGAN) IVPB, zolpidem    Review of Systems:  A 10 point review of systems was performed and was normal, except as mentioned in the HPI, including constitutional, HEENT, heme, lymph, cardiovascular, respiratory, gastrointestinal, genitourinary, neurologic, endocrine, psychiatric and musculoskeletal.      OBJECTIVE:    Physical Exam:  24 Hour Vital Sign Ranges: Temp:  [97.7 °F (36.5 °C)-98.7 °F (37.1 °C)] 97.7 °F (36.5 °C)  Pulse:  [] 91  Resp:  [16-18] 17  SpO2:  [99 %] 99 %  BP: (106-119)/(59-73) 106/59  Most recent vitals: BP (!) 106/59   Pulse 91   Temp 97.7 °F (36.5 °C) (Oral)   Resp 17   Ht 5' 6" (1.676 m)   Wt 96.6 kg (213 lb)   SpO2 99%   Breastfeeding No   BMI 34.38 kg/m²    GEN: well-developed, well-nourished, awake and alert, non-toxic appearing adult  HEENT: PERRL, sclera anicteric, oral mucosa pink and moist without lesion  NECK: trachea midline; Good ROM  CV: regular rate and rhythm, no murmurs or gallops  RESP: clear to auscultation bilaterally, no wheezes, rhonci or rales  ABD: soft, non-tender, +pregnancy, bowel sounds hypoactive  EXT: no swelling or edema, 2+ pulses distally  SKIN: no rashes or jaundice  PSYCH: normal affect    Labs:   Recent Labs     22  1622 06/10/22  0559   WBC 15.49* 14.65*   MCV 82 84    194     Recent Labs     22  1622 06/10/22  0559   * 132*   K 4.4 4.5    102   CO2 20* 20*   BUN 12 10    115*     No results for input(s): ALB in the last " 72 hours.    Invalid input(s): ALKP, SGOT, SGPT, TBIL, DBIL, TPRO  No results for input(s): PT, INR, PTT in the last 72 hours.      Radiology Review:  US OB 14+ Weeks TransAbd, w/Biophysical Profile, w/o NST, Single Gestation (xpd)    (Results Pending)         IMPRESSION / RECOMMENDATIONS:  Patient is a 36 y.o. female 35 weeks pregnant admitted to [facility] on Wednesday with acute severe persistent epigastric stabbing pain new onset.  Underwent ultrasound lipase an MRI testing showing elevated lipase and findings of pancreatitis without choledocholithiasis.    Plan:  1. Acute biliary pancreatitis  -Improved overall today  -Tolerated clear liquid diet without increase in pain or n/v  -OB clearing patient to d/c home; agree she is stable for d/c from GI standpoint as well  -I have discussed with her to avoid fried, fatty foods & continue to advance her diet slowly when home. Should contractions or vomiting persist she is aware to come back to the hospital  -She was instructed to follow-up with general surgery for lap brooke soon after delivery; continue to follow-up closely with OB    2. Constipation in pregnancy  -Okay to use Miralax daily as needed for constipation    Thank you again for this consult. GI will sign-off.    Phylicia Keita  6/11/2022  11:19 AM

## 2022-06-11 NOTE — DISCHARGE SUMMARY
[facility]  Obstetrics  Discharge Summary      Patient Name: Cely Dove  MRN: [id number]  Admission Date: 2022  Hospital Length of Stay: 2 days  Discharge Date and Time:  2022 11:21 AM  Attending Physician: Darnell Cabrera MD   Discharging Provider: Ed Lora MD   Primary Care Provider: Robina Bar MD    HPI: No notes on file    FHT:  Reassuring throughout hospital stay    * No surgery found *     Hospital Course:   No notes on file   36-year-old  4 para 2 at 35 weeks 3 days gestational age admitted by Dr. Cabrera, on-call Ob for suspected pancreatitis.  GI consulted, see consult note for details.  After initially NPO and advancement to clear liquid diet pancreatic enzymes resolving, patient's pain significantly improved.  Patient has been obstetrically  stable throughout hospital stay.  GI cleared for discharge on hospital day 3., see note for details.  Their plan is for cholecystectomy 2 weeks after delivery.  Patient will follow up with primary OB in [place], [place] next week.  See today's progress note for exam  Consults (From admission, onward)        Status Ordering Provider     Inpatient consult to Gastroenterology  Once        Provider:  Adriano Tavarez MD    Completed DARNELL CABRERA          Final Active Diagnoses:    Diagnosis Date Noted POA    PRINCIPAL PROBLEM:  Pancreatitis [K85.90] 2022 Yes      Problems Resolved During this Admission:        Significant Diagnostic Studies: Labs:   CMP   Recent Labs   Lab 22  1622 06/10/22  0559   * 132*   K 4.4 4.5    102   CO2 20* 20*    115*   BUN 12 10   CREATININE 0.7 0.6   CALCIUM 8.4* 8.6*   PROT 6.4 5.5*   ALBUMIN 2.8* 2.3*   BILITOT 1.3* 1.0   ALKPHOS 86 75   AST 15 13   ALT 9* 9*   ANIONGAP 9 10   ESTGFRAFRICA >60.0 >60.0   EGFRNONAA >60.0 >60.0    and CBC   Recent Labs   Lab 22  1622 06/10/22  0559   WBC 15.49* 14.65*   HGB 12.5 10.1*   HCT 39.0 31.9*    194      Lab Results   Component Value Date    GROUPTR O POS 06/09/2022             Immunizations     None          This patient has no babies on file.  Pending Diagnostic Studies:     None          Discharged Condition: good    Disposition: Home or Self Care    Follow Up:   Follow-up Information     Dr. Bar, primary OB Follow up in 1 week(s).                     Patient Instructions:      Diet Adult Regular   Order Comments: Low-fat diet     Activity as tolerated     Medications:  Current Discharge Medication List      CONTINUE these medications which have NOT CHANGED    Details   prenatal vit/iron fum/folic ac (PRENATAL 1+1 ORAL) Take by mouth.         STOP taking these medications       ondansetron (ZOFRAN) 4 MG tablet Comments:   Reason for Stopping:         norethindrone-e.estradiol-iron (LO LOESTRIN FE) 1 mg-10 mcg(24) /10 mcg (2) Tab Comments:   Reason for Stopping:               Ed Lora MD  Obstetrics  [facility]

## 2022-06-11 NOTE — ASSESSMENT & PLAN NOTE
Hospital day 3.-    Pancreatitis-symptoms overall improving, tolerating clear liquids.  Amylase lipase trending down, will repeat labs today.  If cleared by GI will discharge today    IUP at 35 weeks and 3 days gestational age-obstetrically stable.  Will have patient proper follow-up with primary OB next week

## 2022-06-11 NOTE — PROGRESS NOTES
[facility]  Obstetrics  Antepartum Progress Note    Patient Name: Cely Dove  MRN: [id number]  Admission Date: 6/9/2022  Hospital Length of Stay: 1 days  Attending Physician: Darnell Cabrera MD  Primary Care Provider: Robina Bar MD    Subjective:     Principal Problem:Pancreatitis    HPI:  Patient doing well without complaints feels better than previous day no nausea vomiting, no vaginal bleeding, occasional contractions nothing persistent         Objective:     Vital Signs (Most Recent):  Temp: 98.7 °F (37.1 °C) (06/10/22 1228)  Pulse: 102 (06/10/22 1705)  Resp: 18 (06/10/22 1935)  BP: 119/73 (06/10/22 1705)  SpO2: 100 % (06/09/22 1810) Vital Signs (24h Range):  Temp:  [98.5 °F (36.9 °C)-98.8 °F (37.1 °C)] 98.7 °F (37.1 °C)  Pulse:  [] 102  Resp:  [16-18] 18  BP: (103-129)/(52-76) 119/73     Weight: 96.6 kg (213 lb)  Body mass index is 34.38 kg/m².    Strip improved from yesterday overall reassuring occcontractions Q 710 minutes    Intake/Output Summary (Last 24 hours) at 6/10/2022 2239  Last data filed at 6/10/2022 1700  Gross per 24 hour   Intake 2025 ml   Output 1400 ml   Net 625 ml       Physical Exam  No significant change except improvement    Significant Labs:  Recent Lab Results       06/10/22  0559        Albumin 2.3       Alkaline Phosphatase 75       ALT 9       Amylase 269       Anion Gap 10       AST 13       Baso # 0.02       Basophil % 0.1       BILIRUBIN TOTAL 1.0  Comment: For infants and newborns, interpretation of results should be based  on gestational age, weight and in agreement with clinical  observations.    Premature Infant recommended reference ranges:  Up to 24 hours.............<8.0 mg/dL  Up to 48 hours............<12.0 mg/dL  3-5 days..................<15.0 mg/dL  6-29 days.................<15.0 mg/dL         BUN 10       Calcium 8.6       Chloride 102       CO2 20       Creatinine 0.6       Differential Method Automated       eGFR if   American >60.0       eGFR if non  >60.0  Comment: Calculation used to obtain the estimated glomerular filtration  rate (eGFR) is the CKD-EPI equation.          Eos # 0.0       Eosinophil % 0.0       Glucose 115       Gran # (ANC) 13.1       Gran % 89.2       Hematocrit 31.9       Hemoglobin 10.1       Immature Grans (Abs) 0.12  Comment: Mild elevation in immature granulocytes is non specific and   can be seen in a variety of conditions including stress response,   acute inflammation, trauma and pregnancy. Correlation with other   laboratory and clinical findings is essential.         Immature Granulocytes 0.8       Lipase 163       Lymph # 0.9       Lymph % 6.2       MCH 26.6       MCHC 31.7       MCV 84       Mono # 0.5       Mono % 3.7       MPV 11.3       nRBC 0       Platelets 194       Potassium 4.5       PROTEIN TOTAL 5.5       RBC 3.80       RDW 17.3       Sodium 132       WBC 14.65             Assessment/Plan:     36 y.o. female  at 35w2d for:    Active Diagnoses:    Diagnosis Date Noted POA    PRINCIPAL PROBLEM:  Pancreatitis [K85.90] 2022 Yes      Problems Resolved During this Admission:       IUP 35 and 2 with acute pancreatitis clinically improving with lipase down the 191 down from 2700  Status post 1st dose of steroids repeat 24 hours  GI consult pending but will advance diet to clear liquids since patient feeling better      Darnell Cabrera MD  Obstetrics  [facility]

## 2022-06-11 NOTE — NURSING
1115 - Assumed care of patient at 0635. Assessment completed and uneventful except for bilateral lower non-pitting edema. DTR1+/1+. VS WNL. No complaints and feels ready to go home. NST this AM reactive and reviewed by Dr Lora. Seen by GI service, COLIN Keita NP and cleared for discharge.

## 2022-06-14 LAB
BACTERIA BLD CULT: NORMAL
BACTERIA BLD CULT: NORMAL

## 2022-11-01 ENCOUNTER — TELEPHONE (OUTPATIENT)
Dept: FAMILY MEDICINE | Facility: CLINIC | Age: 37
End: 2022-11-01
Payer: MEDICAID

## 2022-11-01 ENCOUNTER — OFFICE VISIT (OUTPATIENT)
Dept: FAMILY MEDICINE | Facility: CLINIC | Age: 37
End: 2022-11-01
Payer: MEDICAID

## 2022-11-01 VITALS
HEART RATE: 66 BPM | BODY MASS INDEX: 32.78 KG/M2 | OXYGEN SATURATION: 98 % | DIASTOLIC BLOOD PRESSURE: 78 MMHG | TEMPERATURE: 99 F | SYSTOLIC BLOOD PRESSURE: 110 MMHG | WEIGHT: 203.94 LBS | HEIGHT: 66 IN

## 2022-11-01 DIAGNOSIS — R53.83 FATIGUE, UNSPECIFIED TYPE: ICD-10-CM

## 2022-11-01 DIAGNOSIS — Z13.31 POSITIVE DEPRESSION SCREENING: ICD-10-CM

## 2022-11-01 DIAGNOSIS — M54.41 ACUTE RIGHT-SIDED LOW BACK PAIN WITH RIGHT-SIDED SCIATICA: ICD-10-CM

## 2022-11-01 DIAGNOSIS — S39.012A LOW BACK STRAIN, INITIAL ENCOUNTER: Primary | ICD-10-CM

## 2022-11-01 PROCEDURE — 1159F PR MEDICATION LIST DOCUMENTED IN MEDICAL RECORD: ICD-10-PCS | Mod: CPTII,,, | Performed by: NURSE PRACTITIONER

## 2022-11-01 PROCEDURE — 3074F PR MOST RECENT SYSTOLIC BLOOD PRESSURE < 130 MM HG: ICD-10-PCS | Mod: CPTII,,, | Performed by: NURSE PRACTITIONER

## 2022-11-01 PROCEDURE — 99213 OFFICE O/P EST LOW 20 MIN: CPT | Mod: PBBFAC,PN | Performed by: NURSE PRACTITIONER

## 2022-11-01 PROCEDURE — 99999 PR PBB SHADOW E&M-EST. PATIENT-LVL III: ICD-10-PCS | Mod: PBBFAC,,, | Performed by: NURSE PRACTITIONER

## 2022-11-01 PROCEDURE — 96372 THER/PROPH/DIAG INJ SC/IM: CPT | Mod: PBBFAC,PN

## 2022-11-01 PROCEDURE — 99999 PR PBB SHADOW E&M-EST. PATIENT-LVL III: CPT | Mod: PBBFAC,,, | Performed by: NURSE PRACTITIONER

## 2022-11-01 PROCEDURE — 3078F PR MOST RECENT DIASTOLIC BLOOD PRESSURE < 80 MM HG: ICD-10-PCS | Mod: CPTII,,, | Performed by: NURSE PRACTITIONER

## 2022-11-01 PROCEDURE — 1159F MED LIST DOCD IN RCRD: CPT | Mod: CPTII,,, | Performed by: NURSE PRACTITIONER

## 2022-11-01 PROCEDURE — 99204 OFFICE O/P NEW MOD 45 MIN: CPT | Mod: S$PBB,,, | Performed by: NURSE PRACTITIONER

## 2022-11-01 PROCEDURE — 3078F DIAST BP <80 MM HG: CPT | Mod: CPTII,,, | Performed by: NURSE PRACTITIONER

## 2022-11-01 PROCEDURE — 99204 PR OFFICE/OUTPT VISIT, NEW, LEVL IV, 45-59 MIN: ICD-10-PCS | Mod: S$PBB,,, | Performed by: NURSE PRACTITIONER

## 2022-11-01 PROCEDURE — 3074F SYST BP LT 130 MM HG: CPT | Mod: CPTII,,, | Performed by: NURSE PRACTITIONER

## 2022-11-01 RX ORDER — CYCLOBENZAPRINE HCL 5 MG
5 TABLET ORAL 3 TIMES DAILY PRN
Qty: 30 TABLET | Refills: 0 | Status: SHIPPED | OUTPATIENT
Start: 2022-11-01 | End: 2022-11-11

## 2022-11-01 RX ORDER — BETAMETHASONE SODIUM PHOSPHATE AND BETAMETHASONE ACETATE 3; 3 MG/ML; MG/ML
12 INJECTION, SUSPENSION INTRA-ARTICULAR; INTRALESIONAL; INTRAMUSCULAR; SOFT TISSUE ONCE
Status: COMPLETED | OUTPATIENT
Start: 2022-11-01 | End: 2022-11-01

## 2022-11-01 RX ORDER — METHYLPREDNISOLONE 4 MG/1
TABLET ORAL
Qty: 21 EACH | Refills: 0 | Status: SHIPPED | OUTPATIENT
Start: 2022-11-01 | End: 2022-11-22

## 2022-11-01 RX ORDER — SERTRALINE HYDROCHLORIDE 25 MG/1
25 TABLET, FILM COATED ORAL DAILY
Qty: 30 TABLET | Refills: 11 | Status: SHIPPED | OUTPATIENT
Start: 2022-11-01 | End: 2022-11-29 | Stop reason: CLARIF

## 2022-11-01 RX ORDER — OMEPRAZOLE 40 MG/1
40 CAPSULE, DELAYED RELEASE ORAL DAILY
COMMUNITY
Start: 2022-09-18

## 2022-11-01 RX ORDER — MELOXICAM 7.5 MG/1
7.5 TABLET ORAL DAILY
Qty: 30 TABLET | Refills: 0 | Status: SHIPPED | OUTPATIENT
Start: 2022-11-01 | End: 2022-12-01

## 2022-11-01 RX ORDER — KETOROLAC TROMETHAMINE 30 MG/ML
15 INJECTION, SOLUTION INTRAMUSCULAR; INTRAVENOUS ONCE
Status: COMPLETED | OUTPATIENT
Start: 2022-11-01 | End: 2022-11-01

## 2022-11-01 RX ADMIN — BETAMETHASONE SODIUM PHOSPHATE AND BETAMETHASONE ACETATE 12 MG: 3; 3 INJECTION, SUSPENSION INTRA-ARTICULAR; INTRALESIONAL; INTRAMUSCULAR at 12:11

## 2022-11-01 RX ADMIN — KETOROLAC TROMETHAMINE 15 MG: 30 INJECTION, SOLUTION INTRAMUSCULAR; INTRAVENOUS at 12:11

## 2022-11-01 NOTE — PROGRESS NOTES
"  Subjective:       Patient ID: Cely Catherine is a 36 y.o. female.    Chief Complaint: Back Pain (Patient c/o persistent right sided low back pain that she states has been present for approximately 3 weeks. Denies any initially known injury or trauma to the area. Reports she does care for her 4 month old and lifts carseat routinely. Denies any issues with urinary system, pain radiating to other areas or locations, denies any numbness. Describes pain as "dull tightness", rates 8/10 on pain scale currently. ) and Depression (Patient reports ongoing issues with depression for several years worsened since having her now 4 month old. Denies any previous encounters with either therapy or medication management. Reports she is agreeable to either one of these options. )    Cely Catherine presents to the clinic today as a new patient for complaints of low back pain    Back Pain:  Patient c/o persistent right sided low back pain that she states has been present for approximately 3 weeks. Denies any initially known injury or trauma to the area. Reports she does care for her 4 month old and lifts carseat routinely. Denies any issues with urinary system, pain radiating to other areas or locations, denies any numbness. Describes pain as "dull tightness", rates 8/10 on pain scale currently. Has been using otc Ibuprofen intermittently with no relief.     Depression:  Positive depression screening.Patient reports ongoing issues with depression for several years worsened since having her now 4 month old. Denies any previous encounters with either therapy or medication management. Reports she is agreeable to either one of these options. Reports she has a history of depression, but has never sought out medications or treatment. Reports it does worsen post partum. Denies any of any self harm or harming others.      Review of Systems   Gastrointestinal:  Positive for diarrhea (post gallbladder removal- intermittent).   Musculoskeletal:  " "Positive for arthralgias, back pain and gait problem.   Psychiatric/Behavioral:  Positive for dysphoric mood and sleep disturbance. Negative for agitation, hallucinations, self-injury and suicidal ideas.      Patient Active Problem List   Diagnosis    Nicotine dependence with current use    Simple obesity    Pancreatitis       Objective:      Physical Exam  Constitutional:       Appearance: Normal appearance. She is obese.   Eyes:      Conjunctiva/sclera: Conjunctivae normal.   Cardiovascular:      Rate and Rhythm: Normal rate.   Pulmonary:      Effort: Pulmonary effort is normal. No respiratory distress.      Breath sounds: Normal breath sounds.   Musculoskeletal:      Lumbar back: Tenderness present. Positive right straight leg raise test.        Back:    Neurological:      Mental Status: She is alert.   Psychiatric:         Attention and Perception: Attention normal.         Mood and Affect: Affect is tearful.         Behavior: Behavior normal.       Lab Results   Component Value Date    WBC 14.65 (H) 06/10/2022    HGB 10.1 (L) 06/10/2022    HCT 31.9 (L) 06/10/2022     06/10/2022    CHOL 107 (L) 10/22/2012    ALT 9 (L) 06/10/2022    AST 13 06/10/2022     (L) 06/10/2022    K 4.5 06/10/2022     06/10/2022    CREATININE 0.6 06/10/2022    BUN 10 06/10/2022    CO2 20 (L) 06/10/2022     The ASCVD Risk score (Danielle DK, et al., 2019) failed to calculate for the following reasons:    The 2019 ASCVD risk score is only valid for ages 40 to 79  Visit Vitals  /78 (BP Location: Right arm, Patient Position: Sitting, BP Method: Large (Manual))   Pulse 66   Temp 98.7 °F (37.1 °C) (Oral)   Ht 5' 6" (1.676 m)   Wt 92.5 kg (203 lb 14.8 oz)   LMP 10/28/2022   SpO2 98%   Breastfeeding No   BMI 32.91 kg/m²      Assessment:       1. Low back strain, initial encounter    2. Post partum depression    3. Positive depression screening    4. Acute right-sided low back pain with right-sided sciatica    5. Fatigue, " unspecified type        Plan:       1. Low back strain, initial encounter  -     cyclobenzaprine (FLEXERIL) 5 MG tablet; Take 1 tablet (5 mg total) by mouth 3 (three) times daily as needed for Muscle spasms.  Dispense: 30 tablet; Refill: 0  -     meloxicam (MOBIC) 7.5 MG tablet; Take 1 tablet (7.5 mg total) by mouth once daily.  Dispense: 30 tablet; Refill: 0  Alternate ice/heat  Sciatica stretches encouraged  Topicals: voltaren/solanpas, Biofreeze   2. Post partum depression  -     sertraline (ZOLOFT) 25 MG tablet; Take 1 tablet (25 mg total) by mouth once daily.  Dispense: 30 tablet; Refill: 11  Trial of zoloft- allow time to reach therapeutic level: can take 4-12 weeks. Not breast feeding.    3. Positive depression screening  Comments:  I have reviewed the positive depression score which warrants active treatment with psychotherapy and/or medications.  Orders:  -     sertraline (ZOLOFT) 25 MG tablet; Take 1 tablet (25 mg total) by mouth once daily.  Dispense: 30 tablet; Refill: 11    4. Acute right-sided low back pain with right-sided sciatica  -     ketorolac injection 15 mg  -     betamethasone acetate-betamethasone sodium phosphate injection 12 mg  -     methylPREDNISolone (MEDROL DOSEPACK) 4 mg tablet; use as directed  Dispense: 21 each; Refill: 0    5. Fatigue, unspecified type  -     TSH; Future; Expected date: 11/01/2022   Obtain TSH for review- reports unwanted weight gain since birth     No follow-ups on file.      Future Appointments       Date Provider Specialty Appt Notes    11/4/2022  Lab fasting lab     11/29/2022 Negin Rodriguez NP Family Medicine 4-6 week follow up lower right back pain

## 2022-11-01 NOTE — TELEPHONE ENCOUNTER
Attempted to reach the patient back regarding appt. No answer. Left message encouraging her to contact us back

## 2022-11-01 NOTE — TELEPHONE ENCOUNTER
----- Message from Vonda Espinoza sent at 11/1/2022  7:39 AM CDT -----  Pt would like to be called back regarding appt  back pain    Pt can be reached at  [phone]

## 2022-11-04 ENCOUNTER — LAB VISIT (OUTPATIENT)
Dept: LAB | Facility: CLINIC | Age: 37
End: 2022-11-04
Payer: MEDICAID

## 2022-11-04 DIAGNOSIS — R53.83 FATIGUE, UNSPECIFIED TYPE: ICD-10-CM

## 2022-11-04 LAB — TSH SERPL DL<=0.005 MIU/L-ACNC: 2.16 UIU/ML (ref 0.4–4)

## 2022-11-04 PROCEDURE — 36415 PR COLLECTION VENOUS BLOOD,VENIPUNCTURE: ICD-10-PCS | Mod: PN,,, | Performed by: STUDENT IN AN ORGANIZED HEALTH CARE EDUCATION/TRAINING PROGRAM

## 2022-11-04 PROCEDURE — 84443 ASSAY THYROID STIM HORMONE: CPT | Performed by: NURSE PRACTITIONER

## 2022-11-04 PROCEDURE — 36415 COLL VENOUS BLD VENIPUNCTURE: CPT | Mod: PN,,, | Performed by: STUDENT IN AN ORGANIZED HEALTH CARE EDUCATION/TRAINING PROGRAM

## 2022-11-16 ENCOUNTER — PATIENT MESSAGE (OUTPATIENT)
Dept: FAMILY MEDICINE | Facility: CLINIC | Age: 37
End: 2022-11-16
Payer: MEDICAID

## 2022-11-17 ENCOUNTER — PATIENT MESSAGE (OUTPATIENT)
Dept: FAMILY MEDICINE | Facility: CLINIC | Age: 37
End: 2022-11-17
Payer: MEDICAID

## 2022-11-17 NOTE — TELEPHONE ENCOUNTER
Cely,  What time of the day are your taking your medication? Are you eating with the medication? Are you taking anything for your headaches like Tylenol, Ibuprofen or Motrin, Excedrin?   While I am able to change the medication I can not guarantee that switching the medication will resolve the side effects as they are common with the entire class of medications and do tend to lessen/resolve after a few weeks of being on the medication.   KATELYNN Chase

## 2022-11-29 ENCOUNTER — OFFICE VISIT (OUTPATIENT)
Dept: FAMILY MEDICINE | Facility: CLINIC | Age: 37
End: 2022-11-29
Payer: MEDICAID

## 2022-11-29 VITALS
TEMPERATURE: 98 F | OXYGEN SATURATION: 97 % | HEART RATE: 71 BPM | BODY MASS INDEX: 33.47 KG/M2 | DIASTOLIC BLOOD PRESSURE: 82 MMHG | WEIGHT: 207.31 LBS | SYSTOLIC BLOOD PRESSURE: 118 MMHG

## 2022-11-29 DIAGNOSIS — E66.9 CLASS 1 OBESITY WITHOUT SERIOUS COMORBIDITY WITH BODY MASS INDEX (BMI) OF 33.0 TO 33.9 IN ADULT, UNSPECIFIED OBESITY TYPE: ICD-10-CM

## 2022-11-29 PROCEDURE — 99213 OFFICE O/P EST LOW 20 MIN: CPT | Mod: PBBFAC,PN | Performed by: NURSE PRACTITIONER

## 2022-11-29 PROCEDURE — 1159F MED LIST DOCD IN RCRD: CPT | Mod: CPTII,,, | Performed by: NURSE PRACTITIONER

## 2022-11-29 PROCEDURE — 99214 PR OFFICE/OUTPT VISIT, EST, LEVL IV, 30-39 MIN: ICD-10-PCS | Mod: S$PBB,,, | Performed by: NURSE PRACTITIONER

## 2022-11-29 PROCEDURE — 3079F DIAST BP 80-89 MM HG: CPT | Mod: CPTII,,, | Performed by: NURSE PRACTITIONER

## 2022-11-29 PROCEDURE — 99214 OFFICE O/P EST MOD 30 MIN: CPT | Mod: S$PBB,,, | Performed by: NURSE PRACTITIONER

## 2022-11-29 PROCEDURE — 3079F PR MOST RECENT DIASTOLIC BLOOD PRESSURE 80-89 MM HG: ICD-10-PCS | Mod: CPTII,,, | Performed by: NURSE PRACTITIONER

## 2022-11-29 PROCEDURE — 99999 PR PBB SHADOW E&M-EST. PATIENT-LVL III: ICD-10-PCS | Mod: PBBFAC,,, | Performed by: NURSE PRACTITIONER

## 2022-11-29 PROCEDURE — 3074F SYST BP LT 130 MM HG: CPT | Mod: CPTII,,, | Performed by: NURSE PRACTITIONER

## 2022-11-29 PROCEDURE — 3008F PR BODY MASS INDEX (BMI) DOCUMENTED: ICD-10-PCS | Mod: CPTII,,, | Performed by: NURSE PRACTITIONER

## 2022-11-29 PROCEDURE — 3074F PR MOST RECENT SYSTOLIC BLOOD PRESSURE < 130 MM HG: ICD-10-PCS | Mod: CPTII,,, | Performed by: NURSE PRACTITIONER

## 2022-11-29 PROCEDURE — 3008F BODY MASS INDEX DOCD: CPT | Mod: CPTII,,, | Performed by: NURSE PRACTITIONER

## 2022-11-29 PROCEDURE — 1159F PR MEDICATION LIST DOCUMENTED IN MEDICAL RECORD: ICD-10-PCS | Mod: CPTII,,, | Performed by: NURSE PRACTITIONER

## 2022-11-29 PROCEDURE — 99999 PR PBB SHADOW E&M-EST. PATIENT-LVL III: CPT | Mod: PBBFAC,,, | Performed by: NURSE PRACTITIONER

## 2022-11-29 RX ORDER — VENLAFAXINE HYDROCHLORIDE 37.5 MG/1
37.5 CAPSULE, EXTENDED RELEASE ORAL DAILY
Qty: 30 CAPSULE | Refills: 11 | Status: SHIPPED | OUTPATIENT
Start: 2022-11-29 | End: 2022-12-29 | Stop reason: DRUGHIGH

## 2022-11-29 NOTE — PROGRESS NOTES
"Subjective:       Patient ID: Cely Catherine is a 36 y.o. female.    Chief Complaint: Back Pain (Patient reports there is still some pain present but that it has improved slightly since last visit. Rates pain currently 5/10 on pain scale. ), Depression (Patient reports she "feels no different" with zoloft but that she now has worsened headaches in addition to fatigue/"feeling tired all the time". ), and Fatigue    Cely Catherine presents to the [facility] today for follow up on depression.     Depression:  HX of depression, was seen for back pain and had Positive depression screening.Ongoing issues with depression for several years worsened since having her now 4 month old daughter. Was started on Zoloft 25 mg. Overall reports she is tolerating, but that she is experiencing more migraines.  Reports she "feels no different" with zoloft. Is essentially medication naive. Concerned about weight gain with SSRIs- inquired about the use of Wellbutrin to assist with mood/ weight.     Simple Obesity:  History of Adipex use in the past that was tolerated well and reports positive weight loss, but reports that this weight returned once the medication was discontinued. Would like to discuss restarting this or a referral to discuss additional medication options.       Review of Systems   Constitutional:  Positive for fatigue.   Respiratory:  Negative for chest tightness, shortness of breath and wheezing.    Cardiovascular:  Negative for chest pain, palpitations and leg swelling.   Gastrointestinal:  Positive for diarrhea (post gallbladder removal- intermittent).   Musculoskeletal:  Positive for arthralgias, back pain and gait problem.   Neurological:  Positive for headaches.   Psychiatric/Behavioral:  Positive for dysphoric mood and sleep disturbance. Negative for agitation, hallucinations, self-injury and suicidal ideas.      Patient Active Problem List   Diagnosis    Nicotine dependence with current use    Simple obesity    " Pancreatitis       Objective:      Physical Exam  Constitutional:       Appearance: Normal appearance. She is obese.   Eyes:      Conjunctiva/sclera: Conjunctivae normal.   Cardiovascular:      Rate and Rhythm: Normal rate and regular rhythm.      Heart sounds: Normal heart sounds. No murmur heard.  Pulmonary:      Effort: Pulmonary effort is normal. No respiratory distress.      Breath sounds: Normal breath sounds.   Neurological:      Mental Status: She is alert.   Psychiatric:         Attention and Perception: Attention normal.         Mood and Affect: Mood normal.         Behavior: Behavior normal.       Lab Results   Component Value Date    WBC 14.65 (H) 06/10/2022    HGB 10.1 (L) 06/10/2022    HCT 31.9 (L) 06/10/2022     06/10/2022    CHOL 107 (L) 10/22/2012    ALT 9 (L) 06/10/2022    AST 13 06/10/2022     (L) 06/10/2022    K 4.5 06/10/2022     06/10/2022    CREATININE 0.6 06/10/2022    BUN 10 06/10/2022    CO2 20 (L) 06/10/2022    TSH 2.162 11/04/2022     The ASCVD Risk score (Danielle DK, et al., 2019) failed to calculate for the following reasons:    The 2019 ASCVD risk score is only valid for ages 40 to 79  Visit Vitals  /82 (BP Location: Right arm, Patient Position: Sitting, BP Method: Large (Manual))   Pulse 71   Temp 98.4 °F (36.9 °C) (Oral)   Wt 94 kg (207 lb 5.5 oz)   LMP 11/26/2022   SpO2 97%   BMI 33.47 kg/m²      Assessment:       1. Post partum depression    2. Class 1 obesity without serious comorbidity with body mass index (BMI) of 33.0 to 33.9 in adult, unspecified obesity type        Plan:       1. Post partum depression  -     venlafaxine (EFFEXOR-XR) 37.5 MG 24 hr capsule; Take 1 capsule (37.5 mg total) by mouth once daily.  Dispense: 30 capsule; Refill: 11  Trial of Effexor vs Zoloft  Notify office of any changes to mood, thoughts or behaviors.   Discussed starting Effexor/ titration of this up prior to any changes to Wellbutrin r/t possibility of more adverse  reactions/side effects with Wellbutrin  2. Class 1 obesity without serious comorbidity with body mass index (BMI) of 33.0 to 33.9 in adult, unspecified obesity type  -     Ambulatory referral/consult to Weight Management Program; Future; Expected date: 12/06/2022   Unable to provide Adipex- Weight management referral for further medication options.   Inquired about Ozempic/Mounjaro- discussed that I would not be able to get coverage as it is off label for obesity and no history of diabetes.   Follow up in about 1 month (around 12/29/2022).      Future Appointments       Date Provider Specialty Appt Notes    12/29/2022 Negin Rodriguez NP Family Medicine 4=6 week f/u medication change

## 2022-12-12 ENCOUNTER — PATIENT MESSAGE (OUTPATIENT)
Dept: FAMILY MEDICINE | Facility: CLINIC | Age: 37
End: 2022-12-12
Payer: MEDICAID

## 2022-12-15 ENCOUNTER — PATIENT MESSAGE (OUTPATIENT)
Dept: FAMILY MEDICINE | Facility: CLINIC | Age: 37
End: 2022-12-15
Payer: MEDICAID

## 2022-12-19 NOTE — TELEPHONE ENCOUNTER
[facility]- [place]: [phone]    [facility]: [place] [phone]    [facility]: [place]     [facility]- [place] [phone]    [facility]- [place] [phone]    None require a referral

## 2022-12-29 ENCOUNTER — OFFICE VISIT (OUTPATIENT)
Dept: FAMILY MEDICINE | Facility: CLINIC | Age: 37
End: 2022-12-29
Payer: MEDICAID

## 2022-12-29 PROCEDURE — 99213 PR OFFICE/OUTPT VISIT, EST, LEVL III, 20-29 MIN: ICD-10-PCS | Mod: GT,,, | Performed by: NURSE PRACTITIONER

## 2022-12-29 PROCEDURE — 99213 OFFICE O/P EST LOW 20 MIN: CPT | Mod: GT,,, | Performed by: NURSE PRACTITIONER

## 2022-12-29 RX ORDER — VENLAFAXINE HYDROCHLORIDE 75 MG/1
75 CAPSULE, EXTENDED RELEASE ORAL DAILY
Qty: 30 CAPSULE | Refills: 11 | Status: SHIPPED | OUTPATIENT
Start: 2022-12-29 | End: 2023-12-29

## 2022-12-29 NOTE — PROGRESS NOTES
Answers submitted by the patient for this visit:  Review of Systems Questionnaire (Submitted on 12/29/2022)  activity change: No  unexpected weight change: No  neck pain: Yes  hearing loss: No  rhinorrhea: No  trouble swallowing: No  eye discharge: No  visual disturbance: No  chest tightness: No  wheezing: No  chest pain: No  palpitations: No  blood in stool: No  constipation: No  vomiting: No  diarrhea: No  polydipsia: No  polyuria: No  difficulty urinating: No  hematuria: No  menstrual problem: No  dysuria: No  joint swelling: No  arthralgias: No  headaches: Yes  weakness: No  confusion: No  dysphoric mood: Yes  Subjective:       Patient ID: Cely Catherine is a 37 y.o. female.    Chief Complaint: No chief complaint on file.    The patient location is: [facility]   The chief complaint leading to consultation is: Virtual visit for follow up on depression since switching from Zoloft to Effexor. Reported worsening of migraines with the use of Zoloft. Reports she has noticed a decrease in the frequency of her headaches since switching her medications, but reports that she has not noticed any difference in her mood.     Visit type: audiovisual    Face to Face time with patient: 10 minutes   15 minutes of total time spent on the encounter, which includes face to face time and non-face to face time preparing to see the patient (eg, review of tests), Obtaining and/or reviewing separately obtained history, Documenting clinical information in the electronic or other health record, Independently interpreting results (not separately reported) and communicating results to the patient/family/caregiver, or Care coordination (not separately reported).         Each patient to whom he or she provides medical services by telemedicine is:  (1) informed of the relationship between the physician and patient and the respective role of any other health care provider with respect to management of the patient; and (2) notified that he or  she may decline to receive medical services by telemedicine and may withdraw from such care at any time.    Notes:         Review of Systems   Constitutional:  Negative for activity change and unexpected weight change.   HENT:  Negative for hearing loss, rhinorrhea and trouble swallowing.    Eyes:  Negative for discharge and visual disturbance.   Respiratory:  Negative for chest tightness and wheezing.    Cardiovascular:  Negative for chest pain and palpitations.   Gastrointestinal:  Negative for blood in stool, constipation, diarrhea and vomiting.   Endocrine: Negative for polydipsia and polyuria.   Genitourinary:  Negative for difficulty urinating, dysuria, hematuria and menstrual problem.   Musculoskeletal:  Positive for neck pain. Negative for arthralgias and joint swelling.   Neurological:  Positive for headaches. Negative for weakness.   Psychiatric/Behavioral:  Positive for dysphoric mood. Negative for confusion.      Patient Active Problem List   Diagnosis    Nicotine dependence with current use    Simple obesity    Pancreatitis       Objective:      Physical Exam  Constitutional:       General: She is not in acute distress.     Appearance: Normal appearance.   Pulmonary:      Effort: Pulmonary effort is normal. No respiratory distress.   Skin:     Coloration: Skin is not jaundiced or pale.   Neurological:      General: No focal deficit present.      Mental Status: She is alert and oriented to person, place, and time.   Psychiatric:         Mood and Affect: Mood normal.         Behavior: Behavior normal.         Thought Content: Thought content normal.         Judgment: Judgment normal.       Lab Results   Component Value Date    WBC 14.65 (H) 06/10/2022    HGB 10.1 (L) 06/10/2022    HCT 31.9 (L) 06/10/2022     06/10/2022    CHOL 107 (L) 10/22/2012    ALT 9 (L) 06/10/2022    AST 13 06/10/2022     (L) 06/10/2022    K 4.5 06/10/2022     06/10/2022    CREATININE 0.6 06/10/2022    BUN 10  06/10/2022    CO2 20 (L) 06/10/2022    TSH 2.162 11/04/2022     The ASCVD Risk score (Danielle DK, et al., 2019) failed to calculate for the following reasons:    The 2019 ASCVD risk score is only valid for ages 40 to 79  There were no vitals taken for this visit.   Assessment:       1. Post partum depression        Plan:       1. Post partum depression  -     venlafaxine (EFFEXOR-XR) 75 MG 24 hr capsule; Take 1 capsule (75 mg total) by mouth once daily.  Dispense: 30 capsule; Refill: 11  Increase Effexor to 75 mg daily  Allow 4-12 weeks to reach therapeutic level  Do not abruptly stop/discontinue medication as withdrawal symptoms can occur- voiced understanding.        Follow up in about 8 weeks (around 2/23/2023).      Future Appointments       Date Provider Specialty Appt Notes    2/13/2023 Negin Rodriguez NP Family Medicine f/u medication

## 2023-03-20 NOTE — PROGRESS NOTES
[facility]  Obstetrics  Antepartum Progress Note    Patient Name: Cely Dove  MRN: [id number]  Admission Date: 2022  Hospital Length of Stay: 2 days  Attending Physician: Darnell Cabrera MD  Primary Care Provider: Robina Bar MD    Subjective:     Principal Problem:Pancreatitis    HPI:  No notes on file    Hospital Course:  No notes on file    Obstetric HPI:  Hospital day 3. IUP at 35 weeks 3 days gestational age with pancreatitis    Patient states feeling much better today, tolerating clears.  No obstetric complaints, states active fetal movement no contractions or vaginal bleeding      Objective:     Vital Signs (Most Recent):  Temp: 97.7 °F (36.5 °C) (22)  Pulse: 91 (22)  Resp: 17 (22)  BP: (!) 106/59 (22)  SpO2: 99 % (22)   Vital Signs (24h Range):  Temp:  [97.7 °F (36.5 °C)-98.7 °F (37.1 °C)] 97.7 °F (36.5 °C)  Pulse:  [] 91  Resp:  [16-18] 17  SpO2:  [99 %] 99 %  BP: (106-119)/(59-73) 106/59     Weight: 96.6 kg (213 lb)  Body mass index is 34.38 kg/m².    FHT:  Baseline 130s and reactive   TOCO:  No contraction pattern      Intake/Output Summary (Last 24 hours) at 2022 1057  Last data filed at 2022 0900  Gross per 24 hour   Intake 1890 ml   Output 3350 ml   Net -1460 ml       Cervix-deferred     Significant Labs:  Recent Lab Results       None            Physical Exam   General-no apparent distress, sitting up in chair resting comfortably  Abdomen-soft minimal to no tenderness to upper abdomen  Uterus-gravid and nontender  Extremities-no calf tenderness       Assessment/Plan:     36 y.o. female  at 35w3d for:    * Pancreatitis  Hospital day 3.-    Pancreatitis-symptoms overall improving, tolerating clear liquids.  Amylase lipase trending down, will repeat labs today.  If cleared by GI will discharge today    IUP at 35 weeks and 3 days gestational age-obstetrically stable.  Will have patient proper  follow-up with primary OB next week      Spoke with GI after this note, no need to repeat lab work if patient improving and cleared patient for discharge      Ed Lora MD  [facility]             The patient is a 64-year-old lady we were called to the cardiac catheterization laboratory to evaluate following her cardiac catheterization that showed left main and severe calcified triple-vessel coronary artery disease.    The patient was admitted with typical unstable angina with a non-ST elevation myocardial infarction and was found to have severe triple-vessel coronary artery disease as described above.  Her cardiac CTA confirmed a very high calcium score of more than 1500 and the cardiac catheterization showed the disease as above.    The patient has a history of hypertension and hypercholesterolemia as well as severe COPD with more than a 75-pack-year history of smoking.  She tells me that there were many years that she smoked more than 3 packets of cigarettes a day but currently has reduced it to less than a packet of cigarettes a day.  She has been on inhalers for a long time.    There is a strong family history of coronary artery disease with her father having passed at a young age of 60.    I reviewed the cardiac catheterization as described above and offered the patient open heart surgery with coronary artery bypass grafting.  Also present at the patient's bedside was the patient's son and extended family with more than 5 family members and all of them had multiple questions along with the patient regarding the operation and the disease process.  I had to spend a significant amount of time with the patient bedside and more than 50% of my time was spent answering questions, counseling and coordinating her care.    We will need to complete a full preoperative work-up and plan for surgery.

## 2023-10-05 NOTE — PLAN OF CARE
Problem: Adult Inpatient Plan of Care  Goal: Plan of Care Review  Outcome: Ongoing, Progressing  Goal: Patient-Specific Goal (Individualized)  Outcome: Ongoing, Progressing  Goal: Absence of Hospital-Acquired Illness or Injury  Outcome: Ongoing, Progressing  Goal: Optimal Comfort and Wellbeing  Outcome: Ongoing, Progressing  Goal: Readiness for Transition of Care  Outcome: Ongoing, Progressing     Problem:  Fall Injury Risk  Goal: Absence of Fall, Infant Drop and Related Injury  Outcome: Ongoing, Progressing     Problem: Pain Acute  Goal: Acceptable Pain Control and Functional Ability  Outcome: Ongoing, Progressing     Problem: Fluid Imbalance (Pancreatitis)  Goal: Fluid Balance  Outcome: Ongoing, Progressing     Problem: Infection (Pancreatitis)  Goal: Infection Symptom Resolution  Outcome: Ongoing, Progressing     Problem: Pain (Pancreatitis)  Goal: Acceptable Pain Control  Outcome: Ongoing, Progressing     Problem: Respiratory Compromise (Pancreatitis)  Goal: Effective Oxygenation and Ventilation  Outcome: Ongoing, Progressing     
  Problem: Fluid Imbalance (Pancreatitis)  Goal: Fluid Balance  Outcome: Ongoing, Progressing     Problem: Infection (Pancreatitis)  Goal: Infection Symptom Resolution  Outcome: Ongoing, Progressing     Problem: Nutrition Impaired (Pancreatitis)  Goal: Optimal Nutrition Intake  Outcome: Ongoing, Progressing     Problem: Pain (Pancreatitis)  Goal: Acceptable Pain Control  Outcome: Ongoing, Progressing     Problem: Respiratory Compromise (Pancreatitis)  Goal: Effective Oxygenation and Ventilation  Outcome: Ongoing, Progressing     
verbal cues/nonverbal cues (demo/gestures)/1 person assist

## 2024-12-18 ENCOUNTER — HOSPITAL ENCOUNTER (EMERGENCY)
Facility: HOSPITAL | Age: 39
Discharge: HOME | End: 2024-12-18
Attending: EMERGENCY MEDICINE
Payer: COMMERCIAL

## 2024-12-18 VITALS
BODY MASS INDEX: 31.83 KG/M2 | SYSTOLIC BLOOD PRESSURE: 136 MMHG | OXYGEN SATURATION: 98 % | TEMPERATURE: 98 F | DIASTOLIC BLOOD PRESSURE: 81 MMHG | WEIGHT: 210 LBS | HEIGHT: 68 IN | RESPIRATION RATE: 16 BRPM | HEART RATE: 69 BPM

## 2024-12-18 DIAGNOSIS — G43.809 MIGRAINE VARIANT: ICD-10-CM

## 2024-12-18 DIAGNOSIS — H53.9 VISUAL DISTURBANCE: Primary | ICD-10-CM

## 2024-12-18 PROBLEM — R29.818 FOCAL NEUROLOGICAL DEFICIT: Status: ACTIVE | Noted: 2024-12-18

## 2024-12-18 LAB
ABO + RH BLD: ABNORMAL
ALBUMIN SERPL BCP-MCNC: 4.2 G/DL (ref 3.5–5.2)
ALP SERPL-CCNC: 57 U/L (ref 55–135)
ALT SERPL W/O P-5'-P-CCNC: 35 U/L (ref 10–44)
AMPHET+METHAMPHET UR QL: NEGATIVE
ANION GAP SERPL CALC-SCNC: 5 MMOL/L (ref 8–16)
APTT PPP: 28.5 SEC (ref 21–32)
AST SERPL-CCNC: 25 U/L (ref 10–40)
BACTERIA #/AREA URNS HPF: ABNORMAL /HPF
BARBITURATES UR QL SCN>200 NG/ML: NEGATIVE
BASOPHILS # BLD AUTO: 0.04 K/UL (ref 0–0.2)
BASOPHILS NFR BLD: 0.4 % (ref 0–1.9)
BENZODIAZ UR QL SCN>200 NG/ML: NEGATIVE
BILIRUB SERPL-MCNC: 0.5 MG/DL (ref 0.1–1)
BILIRUB UR QL STRIP: NEGATIVE
BLD GP AB SCN CELLS X3 SERPL QL: ABNORMAL
BUN SERPL-MCNC: 9 MG/DL (ref 6–20)
BZE UR QL SCN: NEGATIVE
CALCIUM SERPL-MCNC: 8.6 MG/DL (ref 8.7–10.5)
CANNABINOIDS UR QL SCN: NEGATIVE
CHLORIDE SERPL-SCNC: 103 MMOL/L (ref 95–110)
CHOLEST SERPL-MCNC: 158 MG/DL (ref 120–199)
CHOLEST/HDLC SERPL: 4.4 {RATIO} (ref 2–5)
CK SERPL-CCNC: 70 U/L (ref 20–180)
CLARITY UR: CLEAR
CO2 SERPL-SCNC: 28 MMOL/L (ref 23–29)
COLOR UR: YELLOW
CREAT SERPL-MCNC: 0.8 MG/DL (ref 0.5–1.4)
CREAT SERPL-MCNC: 0.9 MG/DL (ref 0.5–1.4)
CREAT UR-MCNC: 77.6 MG/DL (ref 15–325)
DIFFERENTIAL METHOD BLD: ABNORMAL
EOSINOPHIL # BLD AUTO: 0.1 K/UL (ref 0–0.5)
EOSINOPHIL NFR BLD: 1.1 % (ref 0–8)
ERYTHROCYTE [DISTWIDTH] IN BLOOD BY AUTOMATED COUNT: 13.8 % (ref 11.5–14.5)
EST. GFR  (NO RACE VARIABLE): >60 ML/MIN/1.73 M^2
GLUCOSE SERPL-MCNC: 95 MG/DL (ref 70–110)
GLUCOSE UR QL STRIP: NEGATIVE
HCT VFR BLD AUTO: 37.7 % (ref 37–48.5)
HDLC SERPL-MCNC: 36 MG/DL (ref 40–75)
HDLC SERPL: 22.8 % (ref 20–50)
HGB BLD-MCNC: 12.2 G/DL (ref 12–16)
HGB UR QL STRIP: NEGATIVE
IMM GRANULOCYTES # BLD AUTO: 0.04 K/UL (ref 0–0.04)
IMM GRANULOCYTES NFR BLD AUTO: 0.4 % (ref 0–0.5)
INR PPP: 0.9 (ref 0.8–1.2)
KETONES UR QL STRIP: NEGATIVE
LDLC SERPL CALC-MCNC: 90.6 MG/DL (ref 63–159)
LEUKOCYTE ESTERASE UR QL STRIP: ABNORMAL
LIPASE SERPL-CCNC: 60 U/L (ref 4–60)
LYMPHOCYTES # BLD AUTO: 3.4 K/UL (ref 1–4.8)
LYMPHOCYTES NFR BLD: 36.3 % (ref 18–48)
MAGNESIUM SERPL-MCNC: 1.9 MG/DL (ref 1.6–2.6)
MCH RBC QN AUTO: 25.8 PG (ref 27–31)
MCHC RBC AUTO-ENTMCNC: 32.4 G/DL (ref 32–36)
MCV RBC AUTO: 80 FL (ref 82–98)
MICROSCOPIC COMMENT: ABNORMAL
MONOCYTES # BLD AUTO: 0.7 K/UL (ref 0.3–1)
MONOCYTES NFR BLD: 7.2 % (ref 4–15)
NEUTROPHILS # BLD AUTO: 5.1 K/UL (ref 1.8–7.7)
NEUTROPHILS NFR BLD: 54.6 % (ref 38–73)
NITRITE UR QL STRIP: NEGATIVE
NONHDLC SERPL-MCNC: 122 MG/DL
NRBC BLD-RTO: 0 /100 WBC
OPIATES UR QL SCN: NEGATIVE
PCP UR QL SCN>25 NG/ML: NEGATIVE
PH UR STRIP: 6 [PH] (ref 5–8)
PLATELET # BLD AUTO: 254 K/UL (ref 150–450)
PMV BLD AUTO: 10.4 FL (ref 9.2–12.9)
POCT GLUCOSE: 95 MG/DL (ref 70–110)
POTASSIUM SERPL-SCNC: 3.3 MMOL/L (ref 3.5–5.1)
PROT SERPL-MCNC: 6.7 G/DL (ref 6–8.4)
PROT UR QL STRIP: NEGATIVE
PROTHROMBIN TIME: 10.3 SEC (ref 9–12.5)
RBC # BLD AUTO: 4.72 M/UL (ref 4–5.4)
RBC #/AREA URNS HPF: 2 /HPF (ref 0–4)
SAMPLE: NORMAL
SODIUM SERPL-SCNC: 136 MMOL/L (ref 136–145)
SP GR UR STRIP: >1.03 (ref 1–1.03)
SPECIMEN OUTDATE: ABNORMAL
SQUAMOUS #/AREA URNS HPF: 16 /HPF
TOXICOLOGY INFORMATION: NORMAL
TRIGL SERPL-MCNC: 157 MG/DL (ref 30–150)
TROPONIN I SERPL HS-MCNC: 7.5 PG/ML (ref 0–14.9)
TSH SERPL DL<=0.005 MIU/L-ACNC: 1.42 UIU/ML (ref 0.34–5.6)
TSH SERPL DL<=0.005 MIU/L-ACNC: 1.42 UIU/ML (ref 0.34–5.6)
URN SPEC COLLECT METH UR: ABNORMAL
UROBILINOGEN UR STRIP-ACNC: NEGATIVE EU/DL
WBC # BLD AUTO: 9.3 K/UL (ref 3.9–12.7)
WBC #/AREA URNS HPF: 2 /HPF (ref 0–5)

## 2024-12-18 PROCEDURE — 96374 THER/PROPH/DIAG INJ IV PUSH: CPT

## 2024-12-18 PROCEDURE — 86803 HEPATITIS C AB TEST: CPT | Performed by: EMERGENCY MEDICINE

## 2024-12-18 PROCEDURE — 93005 ELECTROCARDIOGRAM TRACING: CPT | Performed by: INTERNAL MEDICINE

## 2024-12-18 PROCEDURE — 85730 THROMBOPLASTIN TIME PARTIAL: CPT | Performed by: EMERGENCY MEDICINE

## 2024-12-18 PROCEDURE — 80061 LIPID PANEL: CPT | Performed by: EMERGENCY MEDICINE

## 2024-12-18 PROCEDURE — G0508 CRIT CARE TELEHEA CONSULT 60: HCPCS | Mod: GT,,, | Performed by: STUDENT IN AN ORGANIZED HEALTH CARE EDUCATION/TRAINING PROGRAM

## 2024-12-18 PROCEDURE — 84484 ASSAY OF TROPONIN QUANT: CPT | Performed by: EMERGENCY MEDICINE

## 2024-12-18 PROCEDURE — 99285 EMERGENCY DEPT VISIT HI MDM: CPT | Mod: 25

## 2024-12-18 PROCEDURE — 63600175 PHARM REV CODE 636 W HCPCS: Performed by: EMERGENCY MEDICINE

## 2024-12-18 PROCEDURE — 82565 ASSAY OF CREATININE: CPT

## 2024-12-18 PROCEDURE — 36415 COLL VENOUS BLD VENIPUNCTURE: CPT | Performed by: EMERGENCY MEDICINE

## 2024-12-18 PROCEDURE — 82962 GLUCOSE BLOOD TEST: CPT

## 2024-12-18 PROCEDURE — 25500020 PHARM REV CODE 255: Performed by: EMERGENCY MEDICINE

## 2024-12-18 PROCEDURE — 86870 RBC ANTIBODY IDENTIFICATION: CPT | Performed by: EMERGENCY MEDICINE

## 2024-12-18 PROCEDURE — 83690 ASSAY OF LIPASE: CPT | Performed by: EMERGENCY MEDICINE

## 2024-12-18 PROCEDURE — 25000003 PHARM REV CODE 250: Performed by: EMERGENCY MEDICINE

## 2024-12-18 PROCEDURE — 93010 ELECTROCARDIOGRAM REPORT: CPT | Mod: ,,, | Performed by: INTERNAL MEDICINE

## 2024-12-18 PROCEDURE — 86900 BLOOD TYPING SEROLOGIC ABO: CPT | Performed by: EMERGENCY MEDICINE

## 2024-12-18 PROCEDURE — 85025 COMPLETE CBC W/AUTO DIFF WBC: CPT | Performed by: EMERGENCY MEDICINE

## 2024-12-18 PROCEDURE — 80307 DRUG TEST PRSMV CHEM ANLYZR: CPT | Performed by: EMERGENCY MEDICINE

## 2024-12-18 PROCEDURE — 87389 HIV-1 AG W/HIV-1&-2 AB AG IA: CPT | Performed by: EMERGENCY MEDICINE

## 2024-12-18 PROCEDURE — 81001 URINALYSIS AUTO W/SCOPE: CPT | Performed by: EMERGENCY MEDICINE

## 2024-12-18 PROCEDURE — 85610 PROTHROMBIN TIME: CPT | Performed by: EMERGENCY MEDICINE

## 2024-12-18 PROCEDURE — 80053 COMPREHEN METABOLIC PANEL: CPT | Performed by: EMERGENCY MEDICINE

## 2024-12-18 PROCEDURE — 82550 ASSAY OF CK (CPK): CPT | Performed by: EMERGENCY MEDICINE

## 2024-12-18 PROCEDURE — 83735 ASSAY OF MAGNESIUM: CPT | Performed by: EMERGENCY MEDICINE

## 2024-12-18 PROCEDURE — 84443 ASSAY THYROID STIM HORMONE: CPT | Performed by: EMERGENCY MEDICINE

## 2024-12-18 RX ORDER — CEFUROXIME AXETIL 500 MG/1
500 TABLET ORAL 2 TIMES DAILY
Qty: 20 TABLET | Refills: 0 | Status: SHIPPED | OUTPATIENT
Start: 2024-12-18 | End: 2024-12-28

## 2024-12-18 RX ORDER — CEFTRIAXONE 1 G/1
1 INJECTION, POWDER, FOR SOLUTION INTRAMUSCULAR; INTRAVENOUS
Status: COMPLETED | OUTPATIENT
Start: 2024-12-18 | End: 2024-12-18

## 2024-12-18 RX ORDER — RIZATRIPTAN BENZOATE 10 MG/1
10 TABLET, ORALLY DISINTEGRATING ORAL
COMMUNITY
Start: 2024-07-13

## 2024-12-18 RX ADMIN — CEFTRIAXONE 1 G: 1 INJECTION, POWDER, FOR SOLUTION INTRAMUSCULAR; INTRAVENOUS at 09:12

## 2024-12-18 RX ADMIN — IOHEXOL 100 ML: 350 INJECTION, SOLUTION INTRAVENOUS at 06:12

## 2024-12-18 RX ADMIN — POTASSIUM BICARBONATE 40 MEQ: 782 TABLET, EFFERVESCENT ORAL at 09:12

## 2024-12-19 LAB
BLOOD GROUP ANTIBODIES SERPL: NORMAL
HCV AB SERPL QL IA: NEGATIVE
HIV 1+2 AB+HIV1 P24 AG SERPL QL IA: NEGATIVE

## 2024-12-19 NOTE — TELEMEDICINE CONSULT
[facility]  Vascular Neurology  Comprehensive Stroke Center  TeleVascular Neurology Acute Consultation Note        Consult Information  Consult to Telemedicine - Acute Stroke  Consult performed by: Emelina Colón MD  Consult ordered by: Gale Zambrano MD          Consulting Provider: GALE ZAMBRANO   Current Providers  No providers found    Patient Location:  [facility] EMERGENCY DEPARTMENT Emergency Department    Spoke hospital nurse at bedside with patient assisting consultant.  Patient information was obtained from patient.       Stroke Documentation  Acute Stroke Times   Last Known Normal Date: 12/18/24  Last Known Normal Time: 1800  Symptom Onset Date: 12/18/24  Symptom Onset Time: 1800  Stroke Team Called Date: 12/18/24  Stroke Team Called Time: 1823  Stroke Team Arrival Date: 12/18/24  Stroke Team Arrival Time: 1830  CT Interpretation Time: 1835  Thrombolytic Therapy Recommended: No  CTA Interpretation Time: 1835  Thrombectomy Recommended: No    NIH Scale:  1a. Level of Consciousness: 0-->Alert, keenly responsive  1b. LOC Questions: 0-->Answers both questions correctly  1c. LOC Commands: 0-->Performs both tasks correctly  2. Best Gaze: 0-->Normal  3. Visual: 0-->No visual loss  4. Facial Palsy: 0-->Normal symmetrical movements  5a. Motor Arm, Left: 0-->No drift, limb holds 90 (or 45) degrees for full 10 secs  5b. Motor Arm, Right: 0-->No drift, limb holds 90 (or 45) degrees for full 10 secs  6a. Motor Leg, Left: 0-->No drift, leg holds 30 degree position for full 5 secs  6b. Motor Leg, Right: 0-->No drift, leg holds 30 degree position for full 5 secs  7. Limb Ataxia: 0-->Absent  8. Sensory: 0-->Normal, no sensory loss  9. Best Language: 0-->No aphasia, normal  10. Dysarthria: 0-->Normal  11. Extinction and Inattention (formerly Neglect): 0-->No abnormality  Total (NIH Stroke Scale): 0      Modified [place]:    [place] Coma Scale: 15   ABCD2 Score:    [id number] Score:    HAS -BLED  "Score:    ICH Score:    Hunt & Anders Classification:      Blood pressure (!) 161/102, pulse 85, temperature 98.2 °F (36.8 °C), temperature source Oral, resp. rate 18, height 5' 8" (1.727 m), weight 95.3 kg (210 lb), last menstrual period 12/04/2024, SpO2 100%.      In my opinion, this was a: Tier 1; VAN Stroke Assessment: Negative     Medical Decision Making  HPI:  39 y.o. female with medical history of pre-DM, migraines - with admission concerns of focal neurological deficits. And Stroke code / neurology called in for the same.      History from patient and medical records review. Acute onset symptoms of bilateral visual blurring; with associated neck stiffness - posterior headaches and right hand clumsiness; with LKN <4.5 hrs prior to ED arrival. No other focal motor or sensory or cranial nerve deficits. Pertinent history includes some symptoms within migrainous spectrum.     No compressive neuropathy pattern of presentation. No associated complex clinical seizures. No history of strokes, seizures.     Exam: awake, alert, following commands, no obvious aphasia or neglect or visual field deficits, no focal motor or sensory or cranial nerve deficits     Images personally reviewed and interpreted:  Study: Head CT and CTA Head & Neck  Study Interpretation: CT head negative for acute findings / no evidence of early or subacute ischemic changes, intracerebral hemorrhage or hyperdense vessel signs  CTA negative for any LVO or MeVO in the pertinent vascular territory of interest - no targets identified for acute or urgent reperfusion therapy. Negative for correlating intracranial or extracranial ANJU pathology.       Assessment and plan:  Recs:    Suspect mimic / atypical headache complex vs migraine aura rather focal cerebral ischemic event. No TNK as per telestroke eval.     Low suspicion for focal cerebrovascular ischemic event or epileptic or neuro inflammatory etiology - however shall r/o.      - MRI brain WO contrast " f/u     If MRI brain positive for acute infarct follow the below   -  / Plavix 300 mg load if not as home med prior - followed by DAPT X 21 day, ASA 81/Plavix 75, with asa thereafter  - target LDL < 70 / Continue atorvastatin  - euglycemic goals   - permissive HTN x 72 hrs post index event / long term < 140/90  - Echo f/u for any immediate high risk cardio embolic etiologies / needing considerations for AC    - PT/OT recs - discharge planning   - F/u PCP for risk factor modification monitoring  -  on DASH diet; exercise and lifestyle modifications when appropriate   - Provide stroke counseling during the visit - Identification of signs; emergency action and ER attention; Risk factor control, optimization for secondary stroke prevention; Compliance to medications   - F/u tele vascular neurology     If MRI brain negative for acute ischemic etiology - consider management of alternative DDx as above     - Supportive care for headache management     - Pharmacological options:   OP Abortive therapy: tylenol prn / ibuporfen prn  - avoid triptan   OP neurology for further regulation on abortive and preventive options       Lytics recommendation: Thrombolytic therapy not recommended due to Suspected stroke mimic     Thrombectomy recommendation: No; No large vessel occlusion identified on imaging   Placement recommendation: do not transfer       Past Medical History:   Diagnosis Date    Chronic neck pain     Headache, chronic migraine without aura     Nicotine dependence with current use     Pancreatitis     Simple obesity      Past Surgical History:   Procedure Laterality Date    APPENDECTOMY       SECTION      TUBAL LIGATION      tubal ligation reversal Bilateral      Family History   Problem Relation Name Age of Onset    Breast cancer Neg Hx      Ovarian cancer Neg Hx         Diagnoses  Problem Noted   Focal Neurological Deficit 2024       Emelina Colón MD      Emergent/Acute neurological  consultation requested by spoke provider due to critical concerns for possible cerebrovascular event that could result in permanent loss of neurologic/bodily function, severe disability or death of this patient.  Immediate/timely evaluation by a highly prepared expert is paramount for optimal outcomes  High risk for neurological deterioration if not properly diagnosed  High risk for neurological deterioration if not treated promplty/as soon as possible  Complex diagnostic evaluation may be required (advanced imaging)  High risk treatment options (thrombolytics and/or thrombectomy)    Patient care was coordinated with spoke provider, including but not limted to    Discussing likely diagnosis/etiology of symptoms  Making recommendations for further diagnostic studies  Making recommendations for intravenous thrombolytics or other advanced therapies  Making recommendations for disposition (admission/transfer for higher level of care)      Neurology consultation requested by spoke provider. Audiovisual encounter with the patient performed using a secure connection.  Results and impressions from the visit are documented on this note and were communicated to the consulting provider/team via direct communication. The note has been shared for addition to the patients electronic medical record.

## 2024-12-19 NOTE — ED PROVIDER NOTES
"Encounter Date: 2024       History     Chief Complaint   Patient presents with    Loss of Vision     Approx 15-20min ago was driving and had loss of vision in both eyes (like going in and out), then had R arm tightness (states felt weird)     39-year-old female presents with complaints of visual problems followed by a sensation of tightness to the right arm.  The patient states that she felt that her vision bilaterally was "going out"--states it feel like her vision was blackening.  This was equal bilaterally and resolved shortly after occurring.  She felt lightheaded and near syncopal at the time.  She then started having an aching dull pain to her right occipital area as well as a sensation of aching and tightness to her right arm and says the arm felt "strange" but denies loss of function, motor weakness, tingling or paresthesias.  Symptoms have currently resolved.  She states she still feels lightheaded and feels off somewhat.  Does have a history of migraines.  Has not had migraines with similar symptoms in the past however.  Denies fever or chills.  Denies chest pain coughing or shortness of breath.  Denies abdominal pain nausea vomiting or diarrhea.  Denies any recent illnesses or new medications.  Denies any other problems or complaints.        Review of patient's allergies indicates:  No Known Allergies  Past Medical History:   Diagnosis Date    Chronic neck pain     Headache, chronic migraine without aura     Nicotine dependence with current use     Pancreatitis     Simple obesity      Past Surgical History:   Procedure Laterality Date    APPENDECTOMY       SECTION      TUBAL LIGATION      tubal ligation reversal Bilateral      Family History   Problem Relation Name Age of Onset    Breast cancer Neg Hx      Ovarian cancer Neg Hx       Social History     Tobacco Use    Smoking status: Former     Current packs/day: 0.00     Average packs/day: 0.5 packs/day for 17.0 years (8.5 ttl pk-yrs)     " Types: Cigarettes     Start date: 2005     Quit date: 11/2021     Years since quitting: 3.1   Substance Use Topics    Alcohol use: No    Drug use: No     Review of Systems   Constitutional: Negative.  Negative for activity change, appetite change, chills, fatigue and fever.   HENT: Negative.  Negative for congestion, sore throat and trouble swallowing.    Eyes:  Positive for visual disturbance. Negative for photophobia, pain, discharge, redness and itching.   Respiratory: Negative.  Negative for cough, chest tightness, shortness of breath and wheezing.    Cardiovascular: Negative.  Negative for chest pain, palpitations and leg swelling.   Gastrointestinal: Negative.  Negative for abdominal distention, abdominal pain, blood in stool, constipation, diarrhea, nausea and vomiting.   Endocrine: Negative.    Genitourinary: Negative.  Negative for decreased urine volume, difficulty urinating, dysuria, flank pain, frequency and urgency.   Musculoskeletal: Negative.  Negative for arthralgias, back pain, myalgias and neck pain.   Skin: Negative.  Negative for rash.   Neurological:  Positive for light-headedness and headaches (Mild aching pain to the right occipital area which has improved.). Negative for dizziness, syncope, facial asymmetry, speech difficulty, weakness and numbness.   Hematological:  Does not bruise/bleed easily.   Psychiatric/Behavioral:  Negative for confusion, decreased concentration, dysphoric mood, hallucinations and sleep disturbance. The patient is nervous/anxious.    All other systems reviewed and are negative.      Physical Exam     Initial Vitals [12/18/24 1816]   BP Pulse Resp Temp SpO2   (!) 161/102 85 18 98.2 °F (36.8 °C) 100 %      MAP       --         Physical Exam    Nursing note and vitals reviewed.  Constitutional: She is cooperative. She does not appear ill. No distress.   HENT:   Head: Normocephalic and atraumatic.   Nose: Nose normal. Mouth/Throat: Uvula is midline, oropharynx is clear  and moist and mucous membranes are normal. No oropharyngeal exudate, posterior oropharyngeal edema or posterior oropharyngeal erythema.   Eyes: Conjunctivae, EOM and lids are normal. Pupils are equal, round, and reactive to light. No scleral icterus.   Neck: Trachea normal and phonation normal. Neck supple. No stridor present. No JVD present.   Normal range of motion.   Full passive range of motion without pain.     Cardiovascular:  Normal rate, regular rhythm, normal heart sounds, intact distal pulses and normal pulses.     Exam reveals no gallop, no distant heart sounds, no friction rub and no decreased pulses.       No murmur heard.  Pulmonary/Chest: Effort normal and breath sounds normal. No respiratory distress. She has no wheezes. She has no rhonchi. She has no rales.   Abdominal: Abdomen is soft. Bowel sounds are normal. She exhibits no distension and no mass. There is no abdominal tenderness.   No right CVA tenderness.  No left CVA tenderness. There is no rigidity.   Musculoskeletal:         General: No tenderness or edema. Normal range of motion.      Right hand: Normal. Normal capillary refill. Normal pulse.      Left hand: Normal. Normal sensation. Normal capillary refill. Normal pulse.      Cervical back: Normal, full passive range of motion without pain, normal range of motion and neck supple. No bony tenderness. No pain with movement or spinous process tenderness. Normal range of motion.      Thoracic back: Normal. No tenderness. Normal range of motion.      Lumbar back: Normal. No tenderness. Normal range of motion.      Right lower leg: Normal. No swelling or tenderness.      Left lower leg: Normal. No swelling or tenderness.      Right foot: Normal. Normal capillary refill. Normal pulse.      Left foot: Normal. Normal capillary refill. Normal pulse.      Comments: Pulses are 2+ throughout, cap refill is less than 2 sec throughout, extremities are nontender throughout with full range of motion. There  is no spinal tenderness to palpation.     Neurological: She is alert and oriented to person, place, and time. She has normal strength. No cranial nerve deficit or sensory deficit. Coordination and gait normal. GCS score is 15. GCS eye subscore is 4. GCS verbal subscore is 5. GCS motor subscore is 6.   No focal deficits.   Skin: Skin is warm, dry and intact. Capillary refill takes less than 2 seconds. No ecchymosis, no petechiae and no rash noted. No erythema. No pallor.   Psychiatric: She has a normal mood and affect. Her speech is normal and behavior is normal.         ED Course   Procedures  Labs Reviewed   CBC W/ AUTO DIFFERENTIAL - Abnormal       Result Value    WBC 9.30      RBC 4.72      Hemoglobin 12.2      Hematocrit 37.7      MCV 80 (*)     MCH 25.8 (*)     MCHC 32.4      RDW 13.8      Platelets 254      MPV 10.4      Immature Granulocytes 0.4      Gran # (ANC) 5.1      Immature Grans (Abs) 0.04      Lymph # 3.4      Mono # 0.7      Eos # 0.1      Baso # 0.04      nRBC 0      Gran % 54.6      Lymph % 36.3      Mono % 7.2      Eosinophil % 1.1      Basophil % 0.4      Differential Method Automated      Narrative:     Release to patient->Immediate  Collection has been rescheduled by 2MB at 12/18/2024 18:43 Reason:   Done   COMPREHENSIVE METABOLIC PANEL - Abnormal    Sodium 136      Potassium 3.3 (*)     Chloride 103      CO2 28      Glucose 95      BUN 9      Creatinine 0.8      Calcium 8.6 (*)     Total Protein 6.7      Albumin 4.2      Total Bilirubin 0.5      Alkaline Phosphatase 57      AST 25      ALT 35      eGFR >60.0      Anion Gap 5 (*)     Narrative:     Release to patient->Immediate  Collection has been rescheduled by 2MB at 12/18/2024 18:43 Reason:   Done   LIPID PANEL - Abnormal    Cholesterol 158      Triglycerides 157 (*)     HDL 36 (*)     LDL Cholesterol 90.6      HDL/Cholesterol Ratio 22.8      Total Cholesterol/HDL Ratio 4.4      Non-HDL Cholesterol 122      Narrative:     Release to  patient->Immediate  Collection has been rescheduled by 2MB at 12/18/2024 18:43 Reason:   Done   URINALYSIS, REFLEX TO URINE CULTURE - Abnormal    Specimen UA Urine, Clean Catch      Color, UA Yellow      Appearance, UA Clear      pH, UA 6.0      Specific Gravity, UA >1.030 (*)     Protein, UA Negative      Glucose, UA Negative      Ketones, UA Negative      Bilirubin (UA) Negative      Occult Blood UA Negative      Nitrite, UA Negative      Urobilinogen, UA Negative      Leukocytes, UA Trace (*)     Narrative:     Specimen Source->Urine   URINALYSIS MICROSCOPIC - Abnormal    RBC, UA 2      WBC, UA 2      Bacteria Moderate (*)     Squam Epithel, UA 16      Microscopic Comment SEE COMMENT      Narrative:     Specimen Source->Urine   PROTIME-INR    Prothrombin Time 10.3      INR 0.9      Narrative:     Release to patient->Immediate  Collection has been rescheduled by 2MB at 12/18/2024 18:43 Reason:   Done   APTT    aPTT 28.5      Narrative:     Release to patient->Immediate  Collection has been rescheduled by 2MB at 12/18/2024 18:43 Reason:   Done   TSH    TSH 1.415      Narrative:     Release to patient->Immediate  Collection has been rescheduled by 2MB at 12/18/2024 18:43 Reason:   Done   TROPONIN I HIGH SENSITIVITY    Troponin I High Sensitivity 7.5      Narrative:     Release to patient->Immediate  Collection has been rescheduled by 2MB at 12/18/2024 18:43 Reason:   Done   LIPASE    Lipase 60      Narrative:     Release to patient->Immediate  Collection has been rescheduled by 2MB at 12/18/2024 18:43 Reason:   Done   DRUG SCREEN PANEL, URINE EMERGENCY    Benzodiazepines Negative      Cocaine (Metab.) Negative      Opiate Scrn, Ur Negative      Barbiturate Screen, Ur Negative      Amphetamine Screen, Ur Negative      THC Negative      Phencyclidine Negative      Creatinine, Urine 77.6      Toxicology Information SEE COMMENT      Narrative:     Specimen Source->Urine   CK    CPK 70      Narrative:     Release to  patient->Immediate  Collection has been rescheduled by 2MB at 12/18/2024 18:43 Reason:   Done   MAGNESIUM    Magnesium 1.9      Narrative:     Release to patient->Immediate  Collection has been rescheduled by 2MB at 12/18/2024 18:43 Reason:   Done   TSH    TSH 1.415      Narrative:     Release to patient->Immediate  Collection has been rescheduled by 2MB at 12/18/2024 18:43 Reason:   Done   HEPATITIS C ANTIBODY   HIV 1 / 2 ANTIBODY   POCT URINE PREGNANCY   TYPE & SCREEN    Specimen Outdate 12/21/2024 23:59     POCT GLUCOSE    POCT Glucose 95     ISTAT CREATININE    POC Creatinine 0.9      Sample VENOUS     POCT GLUCOSE MONITORING CONTINUOUS   POCT GLUCOSE MONITORING CONTINUOUS          Imaging Results              MRI Brain Without Contrast (Final result)  Result time 12/18/24 21:38:46      Final result by Daisy Garibay MD (12/18/24 21:38:46)                   Impression:      No acute abnormality      Electronically signed by: Daisy Garibay  Date:    12/18/2024  Time:    21:38               Narrative:    EXAMINATION:  MRI BRAIN WITHOUT CONTRAST    CLINICAL HISTORY:  Neuro deficit, acute, stroke suspected;.    TECHNIQUE:  Multiplanar multisequence MR imaging of the brain was performed without contrast.    COMPARISON:  Same day CT brain    FINDINGS:  Intracranial compartment:    Ventricles and sulci are normal in size for age without evidence of hydrocephalus. No extra-axial blood or fluid collections.    The brain parenchyma appears normal. No mass lesion, acute hemorrhage, edema or acute infarct.    Normal vascular flow voids are preserved.    Skull/extracranial contents (limited evaluation): Bone marrow signal intensity is normal.                                       X-Ray Chest AP Portable (Final result)  Result time 12/18/24 20:02:38      Final result by Daisy Garibay MD (12/18/24 20:02:38)                   Impression:      No acute findings.      Electronically signed by: Daisy  Lani  Date:    12/18/2024  Time:    20:02               Narrative:    EXAMINATION:  XR CHEST AP PORTABLE    CLINICAL HISTORY:  Stroke;    TECHNIQUE:  Single frontal view of the chest was performed.    COMPARISON:  None    FINDINGS:  Lungs are clear. No focal consolidation. No pleural effusion. No pneumothorax. Normal heart size.                                       CTA Head and Neck (xpd) (Final result)  Result time 12/18/24 19:38:48      Final result by Daisy Garibay MD (12/18/24 19:38:48)                   Impression:      No acute abnormality. No high-grade stenosis or major vessel occlusion.      Electronically signed by: Daisy Garibay  Date:    12/18/2024  Time:    19:38               Narrative:    EXAMINATION:  CTA HEAD AND NECK (XPD)    CLINICAL HISTORY:  Neuro deficit, acute, stroke suspected;    TECHNIQUE:  CT angiogram was performed from the level of the aquiles to the top of the head following the IV administration of 100mL of Omnipaque 350.   Sagittal and coronal reconstructions and maximum intensity projection reconstructions were performed. Arterial stenosis percentages are based on NASCET measurement criteria.    COMPARISON:  None    FINDINGS:  Non-Vascular Structures of the Neck/Thoracic Inlet (limited evaluation): Normal.    Aorta: No acute findings.    Extracranial carotid circulation: No hemodynamically significant stenosis, aneurysmal dilatation, or dissection.    Extracranial vertebral circulation: No hemodynamically significant stenosis, aneurysmal dilatation, or dissection.    Intracranial Arteries: No focal high-grade stenosis, occlusion, or aneurysm.    Venous structures (limited evaluation): Normal.                                       CT HEAD FOR STROKE (Final result)  Result time 12/18/24 18:27:10      Final result by Daisy Garibay MD (12/18/24 18:27:10)                   Impression:      No acute findings.      Electronically signed by: Daisy  Deepakjefe  Date:    12/18/2024  Time:    18:27               Narrative:    EXAMINATION:  CT HEAD FOR STROKE    CLINICAL HISTORY:  Neuro deficit, acute, stroke suspected;    TECHNIQUE:  Low dose axial images were obtained through the head.  Coronal and sagittal reformations were also performed. Contrast was not administered.    COMPARISON:  None.    FINDINGS:  Intracranial compartment:    Ventricles and sulci are normal in size for age without evidence of hydrocephalus. No extra-axial blood or fluid collections.    The brain parenchyma appears normal. No parenchymal mass, hemorrhage, edema or major vascular distribution infarct.    Skull/extracranial contents (limited evaluation): No fracture. Mastoid air cells and paranasal sinuses are essentially clear.                                       Medications   cefTRIAXone injection 1 g (has no administration in time range)   iohexoL (OMNIPAQUE 350) injection 100 mL (100 mLs Intravenous Given 12/18/24 1826)   potassium bicarbonate disintegrating tablet 40 mEq (40 mEq Oral Given 12/18/24 2102)     Medical Decision Making  Emergent evaluation of a 39-year-old female with symptoms as per HPI.  Neurological exam normal.  NIHSS noted to be 0.  Out of an abundance of caution and due to differential diagnosis including stroke/TIA the patient did undergo an acute stroke code workup.  CT scan of the brain negative, CTA with no evidence of large vessel occlusion.  Patient evaluated by tele stroke consultation.  Please see their consult.  Thrombolytics not recommended as the patient has a normal neurological exam and has a stroke score of 0 and symptoms are suspected to be a stroke mimic/possible migraine variant.  Patient will undergo MRI as per recommendation.  MRI is negative, patient is completely asymptomatic feels well and would like to go home.  Have explained the importance of close outpatient evaluation with Neurology as well as primary care.    Amount and/or Complexity of  Data Reviewed  Labs: ordered.  Radiology: ordered.    Risk  Prescription drug management.      Additional MDM:     NIH Stroke Scale:   Level of consciousness = 0 - alert  LOC questions = 0 - answers both correctly  LOC commands = 0 - performs both correctly  Best gaze = 0 - normal  Visual = 0 - no visual loss  Facial palsy = 0 - normal  Motor left arm =  0 - no drift  Motor right arm =  0 - no drift  Motor left leg = 0 - no drift  Motor right leg =  0 - no drift  Limb ataxia = 0 - absent  Sensory = 0 - normal  Best language = 0 - no aphasia  Dysarthria = 0 - normal articulation  Extinction and inattention = 0 - no neglect  NIH Stroke Scale Total = 0              ED Course as of 12/18/24 2146   Wed Dec 18, 2024   2131 Pending MRI at this time []      ED Course User Index  [MH] Paula Camargo MD                           Clinical Impression:  Final diagnoses:  [H53.9] Visual disturbance (Primary)  [G43.809] Migraine variant                 Gale Alan MD  12/18/24 2146

## 2024-12-19 NOTE — DISCHARGE INSTRUCTIONS
Please read and follow discharge instructions and return precautions.  Rest, avoid any strenuous activity, over exertion or overheating.  Keep well hydrated.  It is possible that your symptoms may have been a complex migraine.  It is important to follow up outpatient with Neurology and primary care.  Ceftin as directed until completed.  Increase the potassium in your diet.  Return immediately if you develop new or worsening symptoms or if you have new problems or concerns.

## 2024-12-19 NOTE — SUBJECTIVE & OBJECTIVE
HPI:  39 y.o. female with medical history of pre-DM, migraines - with admission concerns of focal neurological deficits. And Stroke code / neurology called in for the same.      History from patient and medical records review. Acute onset symptoms of bilateral visual blurring; with associated neck stiffness - posterior headaches and right hand clumsiness; with LKN <4.5 hrs prior to ED arrival. No other focal motor or sensory or cranial nerve deficits. Pertinent history includes some symptoms within migrainous spectrum.     No compressive neuropathy pattern of presentation. No associated complex clinical seizures. No history of strokes, seizures.     Exam: awake, alert, following commands, no obvious aphasia or neglect or visual field deficits, no focal motor or sensory or cranial nerve deficits     Images personally reviewed and interpreted:  Study: Head CT and CTA Head & Neck  Study Interpretation: CT head negative for acute findings / no evidence of early or subacute ischemic changes, intracerebral hemorrhage or hyperdense vessel signs  CTA negative for any LVO or MeVO in the pertinent vascular territory of interest - no targets identified for acute or urgent reperfusion therapy. Negative for correlating intracranial or extracranial ANJU pathology.       Assessment and plan:  Recs:    Suspect mimic / atypical headache complex vs migraine aura rather focal cerebral ischemic event. No TNK as per telestroke eval.     Low suspicion for focal cerebrovascular ischemic event or epileptic or neuro inflammatory etiology - however shall r/o.      - MRI brain WO contrast f/u     If MRI brain positive for acute infarct follow the below   -  / Plavix 300 mg load if not as home med prior - followed by DAPT X 21 day, ASA 81/Plavix 75, with asa thereafter  - target LDL < 70 / Continue atorvastatin  - euglycemic goals   - permissive HTN x 72 hrs post index event / long term < 140/90  - Echo f/u for any immediate high risk  cardio embolic etiologies / needing considerations for AC    - PT/OT recs - discharge planning   - F/u PCP for risk factor modification monitoring  -  on DASH diet; exercise and lifestyle modifications when appropriate   - Provide stroke counseling during the visit - Identification of signs; emergency action and ER attention; Risk factor control, optimization for secondary stroke prevention; Compliance to medications   - F/u tele vascular neurology     If MRI brain negative for acute ischemic etiology - consider management of alternative DDx as above     - Supportive care for headache management     - Pharmacological options:   OP Abortive therapy: tylenol prn / ibuporfen prn  - avoid triptan   OP neurology for further regulation on abortive and preventive options       Lytics recommendation: Thrombolytic therapy not recommended due to Suspected stroke mimic     Thrombectomy recommendation: No; No large vessel occlusion identified on imaging   Placement recommendation: do not transfer

## 2024-12-27 LAB
OHS QRS DURATION: 92 MS
OHS QTC CALCULATION: 439 MS

## 2025-02-21 RX ORDER — TRETINOIN 0.25 MG/G
CREAM TOPICAL NIGHTLY
Qty: 20 G | Refills: 1 | Status: SHIPPED | OUTPATIENT
Start: 2025-02-21

## 2025-03-10 ENCOUNTER — OFFICE VISIT (OUTPATIENT)
Dept: URGENT CARE | Facility: CLINIC | Age: 40
End: 2025-03-10
Payer: MEDICAID

## 2025-03-10 VITALS
DIASTOLIC BLOOD PRESSURE: 82 MMHG | BODY MASS INDEX: 31.83 KG/M2 | WEIGHT: 210 LBS | HEART RATE: 81 BPM | HEIGHT: 68 IN | RESPIRATION RATE: 17 BRPM | OXYGEN SATURATION: 99 % | SYSTOLIC BLOOD PRESSURE: 128 MMHG | TEMPERATURE: 98 F

## 2025-03-10 DIAGNOSIS — M54.9 BACK PAIN, UNSPECIFIED BACK LOCATION, UNSPECIFIED BACK PAIN LATERALITY, UNSPECIFIED CHRONICITY: Primary | ICD-10-CM

## 2025-03-10 DIAGNOSIS — S39.012A LUMBAR STRAIN, INITIAL ENCOUNTER: ICD-10-CM

## 2025-03-10 PROBLEM — E03.8 SUBCLINICAL HYPOTHYROIDISM: Status: ACTIVE | Noted: 2025-03-10

## 2025-03-10 PROBLEM — G43.909 MIGRAINE: Status: ACTIVE | Noted: 2021-01-27

## 2025-03-10 PROBLEM — Z98.891 STATUS POST CESAREAN SECTION: Status: ACTIVE | Noted: 2022-01-18

## 2025-03-10 PROBLEM — B00.9 HERPES SIMPLEX TYPE 2 INFECTION: Status: ACTIVE | Noted: 2025-03-10

## 2025-03-10 PROBLEM — O02.1 MISSED ABORTION: Status: ACTIVE | Noted: 2025-03-10

## 2025-03-10 PROBLEM — K85.90 ACUTE PANCREATITIS: Status: ACTIVE | Noted: 2022-06-08

## 2025-03-10 LAB
B-HCG UR QL: NEGATIVE
BILIRUB UR QL STRIP: NEGATIVE
CTP QC/QA: YES
GLUCOSE UR QL STRIP: NEGATIVE
KETONES UR QL STRIP: NEGATIVE
LEUKOCYTE ESTERASE UR QL STRIP: NEGATIVE
PH, POC UA: 6
POC BLOOD, URINE: NEGATIVE
POC NITRATES, URINE: NEGATIVE
PROT UR QL STRIP: NEGATIVE
SP GR UR STRIP: 1.01 (ref 1–1.03)
UROBILINOGEN UR STRIP-ACNC: 0.2 (ref 0.1–1.1)

## 2025-03-10 PROCEDURE — 81025 URINE PREGNANCY TEST: CPT | Mod: S$GLB,,, | Performed by: NURSE PRACTITIONER

## 2025-03-10 PROCEDURE — 99204 OFFICE O/P NEW MOD 45 MIN: CPT | Mod: 25,S$GLB,, | Performed by: NURSE PRACTITIONER

## 2025-03-10 PROCEDURE — 81003 URINALYSIS AUTO W/O SCOPE: CPT | Mod: QW,S$GLB,, | Performed by: NURSE PRACTITIONER

## 2025-03-10 RX ORDER — METHOCARBAMOL 500 MG/1
500 TABLET, FILM COATED ORAL 3 TIMES DAILY
Qty: 30 TABLET | Refills: 0 | Status: SHIPPED | OUTPATIENT
Start: 2025-03-10 | End: 2025-03-20

## 2025-03-10 RX ORDER — HYDROCODONE BITARTRATE AND ACETAMINOPHEN 5; 325 MG/1; MG/1
1 TABLET ORAL DAILY PRN
COMMUNITY
Start: 2025-02-06

## 2025-03-10 RX ORDER — TRANEXAMIC ACID 650 MG/1
1300 TABLET ORAL 3 TIMES DAILY
COMMUNITY
Start: 2025-02-06

## 2025-03-10 NOTE — PROGRESS NOTES
"Subjective:      Patient ID: Cely Catherine is a 39 y.o. female.    Vitals:  height is 5' 8" (1.727 m) and weight is 95.3 kg (210 lb). Her oral temperature is 98.1 °F (36.7 °C). Her blood pressure is 128/82 and her pulse is 81. Her respiration is 17 and oxygen saturation is 99%.     Chief Complaint: Back Pain (Left Lower/Side)  39 year female presents to clinic complaining of left flank and left lumbar pain.  The symptoms onset several days ago and progressively worsened.  She denies any injury or trauma.  She denies any heavy pushing pulling or lifting.  She describes the pain as a sharp sensation.  The pain is worse with rotation of the upper torso.  No alleviating factors.  She denies any fever, chills, nausea or vomiting.  She denies any abdominal pain, diarrhea.  She denies any numbness or tingling.  Back Pain  This is a new problem. The current episode started yesterday. The problem is unchanged. The quality of the pain is described as aching. The pain does not radiate. The pain is at a severity of 8/10.       Constitution: Negative.   HENT: Negative.     Neck: neck negative.   Cardiovascular: Negative.    Eyes: Negative.    Respiratory: Negative.     Genitourinary: Negative.    Musculoskeletal:  Positive for back pain.   Skin: Negative.  Negative for erythema.   Hematologic/Lymphatic: Negative.    Psychiatric/Behavioral: Negative.        Objective:     Physical Exam   Constitutional: She is oriented to person, place, and time.  Non-toxic appearance. She does not appear ill. No distress. normal  HENT:   Head: Normocephalic.   Nose: Nose normal.   Mouth/Throat: Mucous membranes are moist. No oropharyngeal exudate or posterior oropharyngeal erythema. Oropharynx is clear.   Eyes: Conjunctivae are normal. Pupils are equal, round, and reactive to light.   Cardiovascular: Normal rate, regular rhythm, normal heart sounds and normal pulses.   No murmur heard.Exam reveals no gallop.   Pulmonary/Chest: Effort normal " and breath sounds normal. No respiratory distress. She has no wheezes. She has no rales.   Abdominal: Normal appearance. Soft. There is no abdominal tenderness.   Musculoskeletal:        Arms:    Neurological: no focal deficit. She is alert and oriented to person, place, and time.   Skin: Skin is warm, dry, not pale and no rash. Capillary refill takes less than 2 seconds. No erythema   Psychiatric: Her behavior is normal. Mood, judgment and thought content normal.   Nursing note and vitals reviewed.      Assessment:     1. Back pain, unspecified back location, unspecified back pain laterality, unspecified chronicity    2. Lumbar strain, initial encounter        Plan:   The patient's back pain is likely a musculoskeletal strain.  There are no signs of saddle anesthesia, incontinence, neurologic deficits, fevers, trauma or midline tenderness on history or physical to suggest cauda equina, infectious process, fracture or subluxation.  I will treat with muscle relaxers for relief.   UA negative   UPT negative     Back pain, unspecified back location, unspecified back pain laterality, unspecified chronicity  -     POCT Urinalysis, Dipstick, Automated, W/O Scope  -     POCT urine pregnancy  -     methocarbamoL (ROBAXIN) 500 MG Tab; Take 1 tablet (500 mg total) by mouth 3 (three) times daily. for 10 days  Dispense: 30 tablet; Refill: 0    Lumbar strain, initial encounter  -     methocarbamoL (ROBAXIN) 500 MG Tab; Take 1 tablet (500 mg total) by mouth 3 (three) times daily. for 10 days  Dispense: 30 tablet; Refill: 0